# Patient Record
Sex: MALE | Race: BLACK OR AFRICAN AMERICAN | Employment: UNEMPLOYED | URBAN - METROPOLITAN AREA
[De-identification: names, ages, dates, MRNs, and addresses within clinical notes are randomized per-mention and may not be internally consistent; named-entity substitution may affect disease eponyms.]

---

## 2017-02-17 ENCOUNTER — OFFICE VISIT (OUTPATIENT)
Dept: FAMILY MEDICINE CLINIC | Age: 52
End: 2017-02-17

## 2017-02-17 VITALS
OXYGEN SATURATION: 97 % | SYSTOLIC BLOOD PRESSURE: 150 MMHG | HEART RATE: 70 BPM | DIASTOLIC BLOOD PRESSURE: 102 MMHG | WEIGHT: 223.5 LBS | RESPIRATION RATE: 18 BRPM | BODY MASS INDEX: 33.1 KG/M2 | TEMPERATURE: 96.9 F | HEIGHT: 69 IN

## 2017-02-17 DIAGNOSIS — M54.42 CHRONIC BILATERAL LOW BACK PAIN WITH BILATERAL SCIATICA: ICD-10-CM

## 2017-02-17 DIAGNOSIS — G89.4 CHRONIC PAIN SYNDROME: Primary | ICD-10-CM

## 2017-02-17 DIAGNOSIS — M54.2 POSTERIOR NECK PAIN: ICD-10-CM

## 2017-02-17 DIAGNOSIS — I10 HTN (HYPERTENSION), BENIGN: ICD-10-CM

## 2017-02-17 DIAGNOSIS — G89.29 CHRONIC BILATERAL LOW BACK PAIN WITH BILATERAL SCIATICA: ICD-10-CM

## 2017-02-17 DIAGNOSIS — M54.41 CHRONIC BILATERAL LOW BACK PAIN WITH BILATERAL SCIATICA: ICD-10-CM

## 2017-02-17 RX ORDER — OXYCODONE AND ACETAMINOPHEN 10; 325 MG/1; MG/1
.5-1 TABLET ORAL
Qty: 30 TAB | Refills: 0 | Status: SHIPPED | OUTPATIENT
Start: 2017-02-17 | End: 2018-11-12 | Stop reason: ALTCHOICE

## 2017-02-17 RX ORDER — ATENOLOL 25 MG/1
TABLET ORAL
Qty: 30 TAB | Refills: 3 | Status: SHIPPED | OUTPATIENT
Start: 2017-02-17 | End: 2018-03-04 | Stop reason: SDUPTHER
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
## 2017-02-17 NOTE — PATIENT INSTRUCTIONS
TODAY, please go to:   [facility] a urine sample    Please schedule the following appointments at [address] [place] [address]:  · Lab visit, fasting at least one week before you return   · Blood pressure follow up with Dr. Juliann Hidalgo in 3-4 weeks  _____________________     LQBSG'C Plan:  · Start atenolol again, continue other blood pressure medications  _____________________     Review your health maintenance below. Make plans to return and address anything that is due or will be due soon. There are no preventive care reminders to display for this patient.

## 2017-02-17 NOTE — MR AVS SNAPSHOT
Visit Information Date & Time Provider Department Dept. Phone Encounter #  
 2/17/2017  1:20 PM Roz Dominique. Perry Howard MD [place] [id number] [phone] Upcoming Health Maintenance Date Due DTaP/Tdap/Td series (2 - Td) 2/27/2018 Allergies as of 2/17/2017  Review Complete On: 2/17/2017 By: Roz Dominique. Perry Howard MD  
  
 Severity Noted Reaction Type Reactions Amoxicillin  01/12/2010    Rash Augmentin [Amoxicillin-pot Clavulanate]  01/12/2010    Rash Coconut  03/14/2011    Rash Pineapple  03/14/2011    Swelling Current Immunizations  Reviewed on 11/30/2016 Name Date Influenza Vaccine (Quad) PF 11/30/2016 Influenza Vaccine Split 10/18/2010 TDAP Vaccine 2/27/2008 Not reviewed this visit You Were Diagnosed With   
  
 Codes Comments Chronic pain syndrome    -  Primary ICD-10-CM: G89.4 ICD-9-CM: 338.4 Posterior neck pain     ICD-10-CM: M54.2 ICD-9-CM: 723.1 Chronic bilateral low back pain with bilateral sciatica     ICD-10-CM: M54.42, M54.41, G89.29 ICD-9-CM: 724.2, 724.3, 338.29 HTN (hypertension), benign     ICD-10-CM: I10 
ICD-9-CM: 401.1 Vitals BP Pulse Temp Resp Height(growth percentile) Weight(growth percentile) (!) 150/102 (BP 1 Location: Left arm, BP Patient Position: Sitting) 70 96.9 °F (36.1 °C) (Oral) 18 5' 9\" (1.753 m) 223 lb 8 oz (101.4 kg) SpO2 BMI Smoking Status 97% 33.01 kg/m2 Former Smoker Vitals History BMI and BSA Data Body Mass Index Body Surface Area 33.01 kg/m 2 2.22 m 2 Preferred Pharmacy Pharmacy Name Phone [facility] [address] [place] [address] [phone] Your Updated Medication List  
  
   
This list is accurate as of: 2/17/17  2:34 PM.  Always use your most recent med list. amLODIPine 10 mg tablet Commonly known as:  Tad Yoan TAKE ONE TABLET BY MOUTH ONCE DAILY  
  
 atenolol 25 mg tablet Commonly known as:  TENORMIN  
TAKE 1 TABLET BY MOUTH ONCE A DAY  
  
 chlorthalidone 25 mg tablet Commonly known as:  HYGROTEN  
TAKE ONE TABLET BY MOUTH ONCE DAILY FOR BLOOD PRESSURE Eplerenone 50 mg Tab TAKE ONE TABLET BY MOUTH ONCE DAILY FOR BLOOD PRESSURE  
  
 oxyCODONE-acetaminophen  mg per tablet Commonly known as:  PERCOCET 10 Take 0.5-1 Tabs by mouth daily as needed for Pain.  
  
 pravastatin 80 mg tablet Commonly known as:  PRAVACHOL  
TAKE ONE TABLET BY MOUTH ONCE DAILY Prescriptions Printed Refills  
 oxyCODONE-acetaminophen (PERCOCET 10)  mg per tablet 0 Sig: Take 0.5-1 Tabs by mouth daily as needed for Pain. Class: Print Route: Oral  
  
Prescriptions Sent to Pharmacy Refills  
 atenolol (TENORMIN) 25 mg tablet 3 Sig: TAKE 1 TABLET BY MOUTH ONCE A DAY Class: Normal  
 Pharmacy: [address] #: [phone] We Performed the Following [address] [place] [address] Street MONITORING [[id number] Custom] Patient Instructions TODAY, please go to: CHECK OUT Leave a urine sample Please schedule the following appointments at [facility] OUT: 
· Lab visit, fasting at least one week before you return · Blood pressure follow up with Dr. Delon Wright in 3-4 weeks 
_____________________ Today's Plan: 
· Start atenolol again, continue other blood pressure medications 
_____________________ Review your health maintenance below. Make plans to return and address anything that is due or will be due soon. There are no preventive care reminders to display for this patient. Introducing [facility]! Janell Styles introduces [employer] patient portal. Now you can access parts of your medical record, email your doctor's office, and request medication refills online. 1. In your internet browser, go to [email or web address]. [employer] 2. Click on the First Time User? Click Here link in the Sign In box. You will see the New Member Sign Up page. 3. Enter your [employer] Access Code exactly as it appears below. You will not need to use this code after youve completed the sign-up process. If you do not sign up before the expiration date, you must request a new code. · [employer] Access Code: [email or web address] Expires: 2/28/2017 11:50 AM 
 
4. Enter the last four digits of your Social Security Number (xxxx) and Date of Birth (mm/dd/yyyy) as indicated and click Submit. You will be taken to the next sign-up page. 5. Create a [employer] ID. This will be your [employer] login ID and cannot be changed, so think of one that is secure and easy to remember. 6. Create a [employer] password. You can change your password at any time. 7. Enter your Password Reset Question and Answer. This can be used at a later time if you forget your password. 8. Enter your e-mail address. You will receive e-mail notification when new information is available in [address] 9. Click Sign Up. You can now view and download portions of your medical record. 10. Click the Download Summary menu link to download a portable copy of your medical information. If you have questions, please visit the Frequently Asked Questions section of the [employer] website. Remember, [employer] is NOT to be used for urgent needs. For medical emergencies, dial 911. Now available from your iPhone and Android! Please provide this summary of care documentation to your next provider. Your primary care clinician is listed as Kaylie Lord. Isaac Delgado. If you have any questions after today's visit, please call [phone].

## 2017-02-17 NOTE — PROGRESS NOTES
Chief Complaint   Patient presents with   [facility] Establish Care     1. Have you been to the ER, urgent care clinic since your last visit? Hospitalized since your last visit? No    2. Have you seen or consulted any other health care providers outside of the [address] [place] [address] since your last visit? Include any pap smears or colon screening. No    The patient was counseled on the dangers of tobacco use, and was Patient states he doesn't smoke. Quit 06/01/1991 . There are no preventive care reminders to display for this patient. In the event something were to happen to you and you were unable to speak on your behalf, do you have an Advance Directive/ Living Will in place stating your wishes? No  If yes, do we have a copy on file: No    If no, would you like information:   Patient offered and declined, stated that he got a packet at last office visit.

## 2017-02-21 DIAGNOSIS — I10 HTN (HYPERTENSION), BENIGN: ICD-10-CM

## 2017-02-21 NOTE — TELEPHONE ENCOUNTER
He was just in the office to meet Dr Ellen Ritter. He is due back in for follow up in 1 month-this needs to be scheduled-the follow up in the computer for the end of April has been cancelled and needs to be changed to sooner appt.

## 2017-02-22 RX ORDER — AMLODIPINE BESYLATE 10 MG/1
TABLET ORAL
Qty: 30 TAB | Refills: 11 | Status: SHIPPED | OUTPATIENT
Start: 2017-02-22 | End: 2018-03-04 | Stop reason: SDUPTHER

## 2017-02-22 RX ORDER — CHLORTHALIDONE 25 MG/1
TABLET ORAL
Qty: 30 TAB | Refills: 11 | Status: SHIPPED | OUTPATIENT
Start: 2017-02-22 | End: 2018-03-04 | Stop reason: SDUPTHER

## 2017-03-20 ENCOUNTER — LAB ONLY (OUTPATIENT)
Dept: FAMILY MEDICINE CLINIC | Age: 52
End: 2017-03-20

## 2017-03-20 DIAGNOSIS — Z00.00 HEALTHCARE MAINTENANCE: Primary | ICD-10-CM

## 2017-03-20 DIAGNOSIS — E55.9 VITAMIN D DEFICIENCY: ICD-10-CM

## 2017-03-20 DIAGNOSIS — I10 HTN (HYPERTENSION), BENIGN: ICD-10-CM

## 2017-03-20 DIAGNOSIS — E78.5 DYSLIPIDEMIA: ICD-10-CM

## 2017-08-21 DIAGNOSIS — E78.5 DYSLIPIDEMIA: ICD-10-CM

## 2017-08-21 DIAGNOSIS — I10 HTN (HYPERTENSION), BENIGN: ICD-10-CM

## 2017-08-21 RX ORDER — PRAVASTATIN SODIUM 80 MG/1
TABLET ORAL
Qty: 30 TAB | Refills: 11 | Status: SHIPPED | OUTPATIENT
Start: 2017-08-21 | End: 2018-11-12 | Stop reason: SDUPTHER

## 2017-08-21 RX ORDER — EPLERENONE 50 MG/1
TABLET, FILM COATED ORAL
Qty: 30 TAB | Refills: 11 | Status: SHIPPED | OUTPATIENT
Start: 2017-08-21 | End: 2018-12-31 | Stop reason: SDUPTHER

## 2018-03-04 DIAGNOSIS — I10 HTN (HYPERTENSION), BENIGN: ICD-10-CM

## 2018-03-06 NOTE — TELEPHONE ENCOUNTER
PCP: Prakash Tadeo. René Muñoz MD    Last appt: 2/17/2017  No future appointments.     Requested Prescriptions     Pending Prescriptions Disp Refills    amLODIPine (NORVASC) 10 mg tablet [Pharmacy Med Name: AMLODIPINE 10MG TAB] 30 Tab 11     Sig: TAKE ONE TABLET BY MOUTH ONCE DAILY    atenolol (TENORMIN) 25 mg tablet [Pharmacy Med Name: ATENOLOL 25MG       TAB] 30 Tab 3     Sig: TAKE ONE TABLET BY MOUTH ONCE DAILY    chlorthalidone (HYGROTEN) 25 mg tablet [Pharmacy Med Name: CHLORTHALIDONE 25MG    TAB] 30 Tab 11     Sig: TAKE ONE TABLET BY MOUTH ONCE DAILY FOR BLOOD PRESSURE     Lab Results   Component Value Date/Time    Sodium 139 06/11/2015 09:16 AM    Potassium 3.6 06/11/2015 09:16 AM    Chloride 96 (L) 06/11/2015 09:16 AM    CO2 25 06/11/2015 09:16 AM    Anion gap 7 05/31/2012 11:02 AM    Glucose 99 06/11/2015 09:16 AM    BUN 15 06/11/2015 09:16 AM    Creatinine 1.26 06/11/2015 09:16 AM    BUN/Creatinine ratio 12 06/11/2015 09:16 AM    GFR est AA 78 06/11/2015 09:16 AM    GFR est non-AA 67 06/11/2015 09:16 AM    Calcium 9.4 06/11/2015 09:16 AM     Lab Results   Component Value Date/Time    Hemoglobin A1c 5.4 09/19/2011 12:03 PM     Lab Results   Component Value Date/Time    Cholesterol, total 180 06/11/2015 09:16 AM    HDL Cholesterol 42 06/11/2015 09:16 AM    LDL, calculated 116 (H) 06/11/2015 09:16 AM    VLDL, calculated 22 06/11/2015 09:16 AM    Triglyceride 109 06/11/2015 09:16 AM    CHOL/HDL Ratio 4.3 08/09/2010 10:55 AM     No results found for: TSH, TSH2, TSH3, TSHP, TSHEXT

## 2018-03-07 RX ORDER — CHLORTHALIDONE 25 MG/1
TABLET ORAL
Qty: 30 TAB | Refills: 0 | Status: SHIPPED | OUTPATIENT
Start: 2018-03-07 | End: 2018-11-12 | Stop reason: SDUPTHER

## 2018-03-07 RX ORDER — AMLODIPINE BESYLATE 10 MG/1
TABLET ORAL
Qty: 30 TAB | Refills: 0 | Status: SHIPPED | OUTPATIENT
Start: 2018-03-07 | End: 2018-11-12 | Stop reason: SDUPTHER

## 2018-03-07 RX ORDER — ATENOLOL 25 MG/1
TABLET ORAL
Qty: 30 TAB | Refills: 0 | Status: SHIPPED | OUTPATIENT
Start: 2018-03-07 | End: 2018-11-12 | Stop reason: SDUPTHER

## 2018-03-07 NOTE — TELEPHONE ENCOUNTER
Please call patient. Need office visit and also needs lab visit for fasting labs, the week before. No future appointments.

## 2018-11-12 ENCOUNTER — OFFICE VISIT (OUTPATIENT)
Dept: FAMILY MEDICINE CLINIC | Age: 53
End: 2018-11-12

## 2018-11-12 VITALS
RESPIRATION RATE: 16 BRPM | WEIGHT: 230.8 LBS | SYSTOLIC BLOOD PRESSURE: 170 MMHG | HEART RATE: 86 BPM | TEMPERATURE: 97.5 F | OXYGEN SATURATION: 96 % | DIASTOLIC BLOOD PRESSURE: 130 MMHG | HEIGHT: 69 IN | BODY MASS INDEX: 34.18 KG/M2

## 2018-11-12 DIAGNOSIS — Z23 ENCOUNTER FOR IMMUNIZATION: Primary | ICD-10-CM

## 2018-11-12 DIAGNOSIS — E78.5 DYSLIPIDEMIA: ICD-10-CM

## 2018-11-12 DIAGNOSIS — I10 HTN (HYPERTENSION), BENIGN: ICD-10-CM

## 2018-11-12 RX ORDER — EPLERENONE 50 MG/1
TABLET, FILM COATED ORAL
Qty: 30 TAB | Refills: 11 | Status: CANCELLED | OUTPATIENT
Start: 2018-11-12

## 2018-11-12 RX ORDER — CHLORTHALIDONE 25 MG/1
TABLET ORAL
Qty: 30 TAB | Refills: 0 | Status: SHIPPED | OUTPATIENT
Start: 2018-11-12 | End: 2018-12-31 | Stop reason: SDUPTHER

## 2018-11-12 RX ORDER — ATENOLOL 25 MG/1
TABLET ORAL
Qty: 30 TAB | Refills: 0 | Status: SHIPPED | OUTPATIENT
Start: 2018-11-12 | End: 2018-12-31 | Stop reason: SDUPTHER

## 2018-11-12 RX ORDER — AMLODIPINE BESYLATE 10 MG/1
TABLET ORAL
Qty: 30 TAB | Refills: 0 | Status: SHIPPED | OUTPATIENT
Start: 2018-11-12 | End: 2018-12-31 | Stop reason: SDUPTHER

## 2018-11-12 RX ORDER — PRAVASTATIN SODIUM 80 MG/1
TABLET ORAL
Qty: 30 TAB | Refills: 0 | Status: SHIPPED | OUTPATIENT
Start: 2018-11-12 | End: 2018-12-31 | Stop reason: SDUPTHER

## 2018-11-12 NOTE — PROGRESS NOTES
Nick [facility] Sr. Identified pt with two pt identifiers(name and ). Chief Complaint Patient presents with  New Patient [facility] Establish Care  Medication Refill 1. Have you been to the ER, urgent care clinic since your last visit? Hospitalized since your last visit? No 
 
2. Have you seen or consulted any other health care providers outside of the [facility] since your last visit? Include any pap smears or colon screening. No 
 
In the event something were to happen to you and you were unable to speak on your behalf, do you have an Advance Directive/ Living Will in place stating your wishes? YES If yes, do we have a copy on file YES If no, would you like information:     
 
 
 
Would you like to sign up for [employer] today, if you have not already done so? Patient declined If not, would you like information on MyChart, and how to sign up at a later time? No 
 
 
Medication reconciliation up to date and corrected with patient at this time. Today's provider has been notified of reason for visit, vitals and flowsheets obtained on patients. Reviewed record in preparation for visit, huddled with provider and have obtained necessary documentation. Health Maintenance Due Topic  Shingrix Vaccine Age 50> (1 of 2)  FOBT Q 1 YEAR AGE 50-75  DTaP/Tdap/Td series (2 - Td)  Influenza Age 5 to Adult Wt Readings from Last 3 Encounters:  
18 230 lb 12.8 oz (104.7 kg) 17 223 lb 8 oz (101.4 kg) 16 211 lb (95.7 kg) Temp Readings from Last 3 Encounters:  
17 96.9 °F (36.1 °C) (Oral)  
16 97 °F (36.1 °C)  
16 96.6 °F (35.9 °C) (Oral) BP Readings from Last 3 Encounters:  
17 (!) 150/102  
16 (!) 143/91  
16 134/86 Pulse Readings from Last 3 Encounters:  
17 70  
16 66  
16 67 Vitals:  
 18 1447 18 1454 BP: (!) 188/141 (!) 170/130 Pulse: 86 Resp: 16   
 Temp: 97.5 °F (36.4 °C) TempSrc: Oral   
SpO2: 96% Weight: 230 lb 12.8 oz (104.7 kg) Height: 5' 8.9\" (1.75 m) PainSc:   6 PainLoc: Generalized Learning Assessment: 
:  
 
Learning Assessment 2/3/2014 PRIMARY LEARNER Patient HIGHEST LEVEL OF EDUCATION - PRIMARY LEARNER  DID NOT GRADUATE HIGH SCHOOL  
BARRIERS PRIMARY LEARNER NONE  
CO-LEARNER CAREGIVER No  
PRIMARY LANGUAGE ENGLISH  
LEARNER PREFERENCE PRIMARY VIDEOS  
ANSWERED BY patient RELATIONSHIP SELF Depression Screening: 
:  
 
PHQ over the last two weeks 11/12/2018 Little interest or pleasure in doing things Several days Feeling down, depressed, irritable, or hopeless Several days Total Score PHQ 2 2 Fall Risk Assessment: 
:  
 
Fall Risk Assessment, last 12 mths 11/12/2018 Able to walk? Yes Fall in past 12 months? No  
 
 
Abuse Screening: 
:  
 
Abuse Screening Questionnaire 11/12/2018 2/17/2017 Do you ever feel afraid of your partner? Cassia Mode Are you in a relationship with someone who physically or mentally threatens you? Cassia Mode Is it safe for you to go home? Y Y  
 
 
ADL Screening: 
:  
 

## 2018-11-12 NOTE — PROGRESS NOTES
Immunization/s administered 11/12/2018 by Sarah Pope with patient's consent. Patient tolerated procedure well. No reactions noted.

## 2018-11-12 NOTE — PROGRESS NOTES
Let BP meds run out. States \"blew it off\" as not good experience with Dr. Carla Duff last [address]. Knows untreated BP can cause strokes and heart attacks. Lots of stress past few years. States does need to see pain specialist.  States tramadol puts him to sleep. States has neurostim in back from [facility]. Ref from ortho to surgeon. Out of town a lot. Wants to get back into . Visit Vitals BP (!) 170/130 (BP 1 Location: Left arm, BP Patient Position: Sitting) Comment: taking manually Pulse 86 Temp 97.5 °F (36.4 °C) (Oral) Resp 16 Ht 5' 8.9\" (1.75 m) Wt 230 lb 12.8 oz (104.7 kg) SpO2 96% BMI 34.18 kg/m² Patient alert and cooperative. Reviewed above. Assessment: 1. HTN, markedly uncontrolled off his four medicines. Plan: 
1. Will get him back on three of the four. 2. Return in a month. 3. Refill of cholesterol medicine. 4. Will get labs once we are back on stable BP regimen. 5. Offered referral to pain specialist for chronic narcotic therapy. Patient declined at this time. 6. Follow otherwise here prn.

## 2018-12-31 DIAGNOSIS — E78.5 DYSLIPIDEMIA: ICD-10-CM

## 2018-12-31 DIAGNOSIS — I10 HTN (HYPERTENSION), BENIGN: ICD-10-CM

## 2018-12-31 RX ORDER — PRAVASTATIN SODIUM 80 MG/1
TABLET ORAL
Qty: 30 TAB | Refills: 0 | Status: SHIPPED | OUTPATIENT
Start: 2018-12-31 | End: 2019-03-07 | Stop reason: SDUPTHER

## 2018-12-31 RX ORDER — CHLORTHALIDONE 25 MG/1
TABLET ORAL
Qty: 30 TAB | Refills: 0 | Status: SHIPPED | OUTPATIENT
Start: 2018-12-31 | End: 2019-03-07 | Stop reason: SDUPTHER

## 2018-12-31 RX ORDER — AMLODIPINE BESYLATE 10 MG/1
TABLET ORAL
Qty: 30 TAB | Refills: 0 | Status: SHIPPED | OUTPATIENT
Start: 2018-12-31 | End: 2019-03-07 | Stop reason: SDUPTHER

## 2018-12-31 RX ORDER — ATENOLOL 25 MG/1
TABLET ORAL
Qty: 30 TAB | Refills: 0 | Status: SHIPPED | OUTPATIENT
Start: 2018-12-31 | End: 2019-03-07 | Stop reason: SDUPTHER

## 2018-12-31 RX ORDER — EPLERENONE 50 MG/1
TABLET, FILM COATED ORAL
Qty: 30 TAB | Refills: 0 | Status: SHIPPED | OUTPATIENT
Start: 2018-12-31 | End: 2019-03-07 | Stop reason: SDUPTHER

## 2018-12-31 NOTE — TELEPHONE ENCOUNTER
PCP: Sumit Guajardo MD    Last appt: 12/10/2018  No future appointments.     Requested Prescriptions     Pending Prescriptions Disp Refills    pravastatin (PRAVACHOL) 80 mg tablet 30 Tab 0     Sig: TAKE ONE TABLET BY MOUTH ONCE DAILY    eplerenone (INSPRA) 50 mg tab tablet 30 Tab 11     Sig: TAKE ONE TABLET BY MOUTH ONCE DAILY FOR BLOOD PRESSURE    chlorthalidone (HYGROTEN) 25 mg tablet 30 Tab 0     Sig: TAKE ONE TABLET BY MOUTH ONCE DAILY FOR BLOOD PRESSURE    atenolol (TENORMIN) 25 mg tablet 30 Tab 0     Sig: TAKE ONE TABLET BY MOUTH ONCE DAILY    amLODIPine (NORVASC) 10 mg tablet 30 Tab 0     Sig: TAKE ONE TABLET BY MOUTH ONCE DAILY       Prior labs and Blood pressures:  BP Readings from Last 3 Encounters:   11/12/18 (!) 170/130   02/17/17 (!) 150/102   11/30/16 (!) 143/91     Lab Results   Component Value Date/Time    Sodium 139 06/11/2015 09:16 AM    Potassium 3.6 06/11/2015 09:16 AM    Chloride 96 (L) 06/11/2015 09:16 AM    CO2 25 06/11/2015 09:16 AM    Anion gap 7 05/31/2012 11:02 AM    Glucose 99 06/11/2015 09:16 AM    BUN 15 06/11/2015 09:16 AM    Creatinine 1.26 06/11/2015 09:16 AM    BUN/Creatinine ratio 12 06/11/2015 09:16 AM    GFR est AA 78 06/11/2015 09:16 AM    GFR est non-AA 67 06/11/2015 09:16 AM    Calcium 9.4 06/11/2015 09:16 AM     Lab Results   Component Value Date/Time    Hemoglobin A1c 5.4 09/19/2011 12:03 PM     Lab Results   Component Value Date/Time    Cholesterol, total 180 06/11/2015 09:16 AM    HDL Cholesterol 42 06/11/2015 09:16 AM    LDL, calculated 116 (H) 06/11/2015 09:16 AM    VLDL, calculated 22 06/11/2015 09:16 AM    Triglyceride 109 06/11/2015 09:16 AM    CHOL/HDL Ratio 4.3 08/09/2010 10:55 AM     Lab Results   Component Value Date/Time    VITAMIN D, 25-HYDROXY 20.3 (L) 06/11/2015 09:16 AM       No results found for: TSH, TSH2, TSH3, TSHP, TSHEXT

## 2018-12-31 NOTE — TELEPHONE ENCOUNTER
Requested Prescriptions     Pending Prescriptions Disp Refills    pravastatin (PRAVACHOL) 80 mg tablet 30 Tab 0     Sig: TAKE ONE TABLET BY MOUTH ONCE DAILY    eplerenone (INSPRA) 50 mg tab tablet 30 Tab 11     Sig: TAKE ONE TABLET BY MOUTH ONCE DAILY FOR BLOOD PRESSURE    chlorthalidone (HYGROTEN) 25 mg tablet 30 Tab 0     Sig: TAKE ONE TABLET BY MOUTH ONCE DAILY FOR BLOOD PRESSURE    atenolol (TENORMIN) 25 mg tablet 30 Tab 0     Sig: TAKE ONE TABLET BY MOUTH ONCE DAILY    amLODIPine (NORVASC) 10 mg tablet 30 Tab 0     Sig: TAKE ONE TABLET BY MOUTH ONCE DAILY

## 2019-03-07 ENCOUNTER — OFFICE VISIT (OUTPATIENT)
Dept: FAMILY MEDICINE CLINIC | Age: 54
End: 2019-03-07

## 2019-03-07 VITALS
SYSTOLIC BLOOD PRESSURE: 142 MMHG | HEIGHT: 69 IN | RESPIRATION RATE: 20 BRPM | TEMPERATURE: 97.3 F | WEIGHT: 228.9 LBS | HEART RATE: 60 BPM | BODY MASS INDEX: 33.9 KG/M2 | DIASTOLIC BLOOD PRESSURE: 100 MMHG | OXYGEN SATURATION: 99 %

## 2019-03-07 DIAGNOSIS — I10 HTN (HYPERTENSION), BENIGN: Primary | ICD-10-CM

## 2019-03-07 DIAGNOSIS — E78.5 DYSLIPIDEMIA: ICD-10-CM

## 2019-03-07 DIAGNOSIS — E55.9 VITAMIN D DEFICIENCY: ICD-10-CM

## 2019-03-07 DIAGNOSIS — Z12.5 PROSTATE CANCER SCREENING: ICD-10-CM

## 2019-03-07 RX ORDER — ATENOLOL 25 MG/1
TABLET ORAL
Qty: 30 TAB | Refills: 0 | Status: SHIPPED | OUTPATIENT
Start: 2019-03-07 | End: 2019-05-14 | Stop reason: SDUPTHER

## 2019-03-07 RX ORDER — PRAVASTATIN SODIUM 80 MG/1
TABLET ORAL
Qty: 30 TAB | Refills: 0 | Status: SHIPPED | OUTPATIENT
Start: 2019-03-07 | End: 2019-05-14 | Stop reason: SDUPTHER

## 2019-03-07 RX ORDER — AMLODIPINE BESYLATE 10 MG/1
TABLET ORAL
Qty: 30 TAB | Refills: 0 | Status: SHIPPED | OUTPATIENT
Start: 2019-03-07 | End: 2019-05-14 | Stop reason: SDUPTHER

## 2019-03-07 RX ORDER — EPLERENONE 50 MG/1
TABLET, FILM COATED ORAL
Qty: 30 TAB | Refills: 0 | Status: SHIPPED | OUTPATIENT
Start: 2019-03-07 | End: 2019-05-14 | Stop reason: SDUPTHER

## 2019-03-07 RX ORDER — CHLORTHALIDONE 25 MG/1
TABLET ORAL
Qty: 30 TAB | Refills: 0 | Status: SHIPPED | OUTPATIENT
Start: 2019-03-07 | End: 2019-05-14 | Stop reason: SDUPTHER

## 2019-03-07 NOTE — PROGRESS NOTES
Stephanie Craft Sr. Identified pt with two pt identifiers(name and ). Chief Complaint   Patient presents with    Letter for School/Work     needs a return to work letter from Dr. Pippa Delaney       1. Have you been to the ER, urgent care clinic since your last visit? Hospitalized since your last visit? No    2. Have you seen or consulted any other health care providers outside of the [address] [place] [address] since your last visit? Include any pap smears or colon screening. No      Would you like to sign up for MyChart today, if you have not already done so? No  If not, would you like information on MyChart, and how to sign up at a later time? No      Medication reconciliation up to date and corrected with patient at this time. Today's provider has been notified of reason for visit, vitals and flowsheets obtained on patients. Reviewed record in preparation for visit, huddled with provider and have obtained necessary documentation.       Health Maintenance Due   Topic    Shingrix Vaccine Age 50> (1 of 2)    FOBT Q 1 YEAR AGE 50-75     DTaP/Tdap/Td series (2 - Td)       Wt Readings from Last 3 Encounters:   19 228 lb 14.4 oz (103.8 kg)   18 230 lb 12.8 oz (104.7 kg)   17 223 lb 8 oz (101.4 kg)     Temp Readings from Last 3 Encounters:   19 97.3 °F (36.3 °C) (Oral)   18 97.5 °F (36.4 °C) (Oral)   17 96.9 °F (36.1 °C) (Oral)     BP Readings from Last 3 Encounters:   19 (!) 142/100   18 (!) 170/130   17 (!) 150/102     Pulse Readings from Last 3 Encounters:   19 60   18 86   17 70     Vitals:    19 1344 19 1349   BP: (!) 161/114 (!) 142/100   Pulse: 60    Resp: 20    Temp: 97.3 °F (36.3 °C)    TempSrc: Oral    SpO2: 99%    Weight: 228 lb 14.4 oz (103.8 kg)    Height: 5' 9\" (1.753 m)    PainSc:   4    PainLoc: Back          Learning Assessment:  :     Learning Assessment 2/3/2014   PRIMARY LEARNER Patient   HIGHEST LEVEL OF EDUCATION - PRIMARY LEARNER  DID NOT GRADUATE HIGH SCHOOL   BARRIERS PRIMARY LEARNER NONE   CO-LEARNER CAREGIVER No   PRIMARY LANGUAGE ENGLISH   LEARNER PREFERENCE PRIMARY VIDEOS   ANSWERED BY patient   RELATIONSHIP SELF       Depression Screening:  :     3 most recent PHQ Screens 3/7/2019   Little interest or pleasure in doing things Not at all   Feeling down, depressed, irritable, or hopeless Not at all   Total Score PHQ 2 0       Fall Risk Assessment:  :     Fall Risk Assessment, last 12 mths 3/7/2019   Able to walk? Yes   Fall in past 12 months? No       Abuse Screening:  :     Abuse Screening Questionnaire 3/7/2019 11/12/2018 2/17/2017   Do you ever feel afraid of your partner? N N N   Are you in a relationship with someone who physically or mentally threatens you? N N N   Is it safe for you to go home?  Y Y Y       ADL Screening:  :     ADL Assessment 3/7/2019   Feeding yourself No Help Needed   Getting from bed to chair No Help Needed   Getting dressed No Help Needed   Bathing or showering No Help Needed   Walk across the room (includes cane/walker) No Help Needed   Using the telphone No Help Needed   Taking your medications No Help Needed   Preparing meals No Help Needed   Managing money (expenses/bills) No Help Needed   Moderately strenuous housework (laundry) No Help Needed   Shopping for personal items (toiletries/medicines) No Help Needed   Shopping for groceries No Help Needed   Driving No Help Needed   Climbing a flight of stairs No Help Needed   Getting to places beyond walking distances No Help Needed

## 2019-03-07 NOTE — ACP (ADVANCE CARE PLANNING)
In the event something were to happen to you and you were unable to speak on your behalf, do you have an Advance Directive/ Living Will in place stating your wishes?  YES    If yes, do we have a copy on file YES    Patient states that no changes have been made to ACP on file

## 2019-03-07 NOTE — PROGRESS NOTES
Not taking med daily. On the road a lot. Discussed use of pill box. Patient denies chest pain, dyspnea, unexpected weight change, unexpected pain, mood or memory changes. HTN-worse. Noncompliant with meds. Hyperlipids-worse. Noncompliant with meds. Visit Vitals  BP (!) 142/100 (BP 1 Location: Left arm, BP Patient Position: Sitting) Comment: taken manually with large cuff   Pulse 60   Temp 97.3 °F (36.3 °C) (Oral)   Resp 20   Ht 5' 9\" (1.753 m)   Wt 228 lb 14.4 oz (103.8 kg)   SpO2 99%   BMI 33.80 kg/m²       Patient alert and cooperative. Reviewed above. Assessment:  1. HTN, uncontrolled as is not taking medicine on a regular basis. 2. Previous hyperlipids, on high dose Pravastatin, again not taking med daily. Plan:  1. Check labs. 2. Refilled all meds for a month. 3. Return prior to running out for BP check. 4. Completed work form as best as I could. 5. Follow otherwise here prn.

## 2019-03-08 LAB
25(OH)D3+25(OH)D2 SERPL-MCNC: 9.7 NG/ML (ref 30–100)
ALBUMIN SERPL-MCNC: 4.4 G/DL (ref 3.5–5.5)
ALBUMIN/GLOB SERPL: 1.5 {RATIO} (ref 1.2–2.2)
ALP SERPL-CCNC: 80 IU/L (ref 39–117)
ALT SERPL-CCNC: 27 IU/L (ref 0–44)
AST SERPL-CCNC: 24 IU/L (ref 0–40)
BASOPHILS # BLD AUTO: 0 X10E3/UL (ref 0–0.2)
BASOPHILS NFR BLD AUTO: 0 %
BILIRUB SERPL-MCNC: <0.2 MG/DL (ref 0–1.2)
BUN SERPL-MCNC: 8 MG/DL (ref 6–24)
BUN/CREAT SERPL: 8 (ref 9–20)
CALCIUM SERPL-MCNC: 9.9 MG/DL (ref 8.7–10.2)
CHLORIDE SERPL-SCNC: 102 MMOL/L (ref 96–106)
CHOLEST SERPL-MCNC: 226 MG/DL (ref 100–199)
CO2 SERPL-SCNC: 30 MMOL/L (ref 20–29)
CREAT SERPL-MCNC: 1.04 MG/DL (ref 0.76–1.27)
EOSINOPHIL # BLD AUTO: 0.2 X10E3/UL (ref 0–0.4)
EOSINOPHIL NFR BLD AUTO: 3 %
ERYTHROCYTE [DISTWIDTH] IN BLOOD BY AUTOMATED COUNT: 15.3 % (ref 12.3–15.4)
GLOBULIN SER CALC-MCNC: 2.9 G/DL (ref 1.5–4.5)
GLUCOSE SERPL-MCNC: 90 MG/DL (ref 65–99)
HCT VFR BLD AUTO: 39.2 % (ref 37.5–51)
HDLC SERPL-MCNC: 40 MG/DL
HGB BLD-MCNC: 13.3 G/DL (ref 13–17.7)
IMM GRANULOCYTES # BLD AUTO: 0 X10E3/UL (ref 0–0.1)
IMM GRANULOCYTES NFR BLD AUTO: 0 %
INTERPRETATION, 910389: NORMAL
LDLC SERPL CALC-MCNC: 137 MG/DL (ref 0–99)
LYMPHOCYTES # BLD AUTO: 2 X10E3/UL (ref 0.7–3.1)
LYMPHOCYTES NFR BLD AUTO: 27 %
MCH RBC QN AUTO: 27.3 PG (ref 26.6–33)
MCHC RBC AUTO-ENTMCNC: 33.9 G/DL (ref 31.5–35.7)
MCV RBC AUTO: 81 FL (ref 79–97)
MONOCYTES # BLD AUTO: 0.5 X10E3/UL (ref 0.1–0.9)
MONOCYTES NFR BLD AUTO: 7 %
NEUTROPHILS # BLD AUTO: 4.7 X10E3/UL (ref 1.4–7)
NEUTROPHILS NFR BLD AUTO: 63 %
PLATELET # BLD AUTO: 285 X10E3/UL (ref 150–379)
POTASSIUM SERPL-SCNC: 3.8 MMOL/L (ref 3.5–5.2)
PROT SERPL-MCNC: 7.3 G/DL (ref 6–8.5)
PSA SERPL-MCNC: 1.2 NG/ML (ref 0–4)
RBC # BLD AUTO: 4.87 X10E6/UL (ref 4.14–5.8)
SODIUM SERPL-SCNC: 146 MMOL/L (ref 134–144)
TRIGL SERPL-MCNC: 244 MG/DL (ref 0–149)
TSH SERPL DL<=0.005 MIU/L-ACNC: 0.89 UIU/ML (ref 0.45–4.5)
VLDLC SERPL CALC-MCNC: 49 MG/DL (ref 5–40)
WBC # BLD AUTO: 7.5 X10E3/UL (ref 3.4–10.8)

## 2019-03-12 NOTE — PROGRESS NOTES
Very Low Vit. D.  Begin ergocalciferol 50 K units twice weekly and recheck 3 mos. Lipids up a little. Rec get heart scan to assess CAD risk.   Rest O.K.

## 2019-03-18 DIAGNOSIS — E55.9 VITAMIN D DEFICIENCY: Primary | ICD-10-CM

## 2019-03-18 RX ORDER — ERGOCALCIFEROL 1.25 MG/1
50000 CAPSULE ORAL 2 TIMES WEEKLY
Qty: 24 CAP | Refills: 0 | Status: SHIPPED | OUTPATIENT
Start: 2019-03-18 | End: 2019-09-18 | Stop reason: SDUPTHER

## 2019-03-18 NOTE — PROGRESS NOTES
Verbal Order Read Back Per Read, Garret Vivar MD for Vitamin D 50,000 units 2 times a week for 3 months, # 24, PO, 0 refills on 3/18/2019 due to Vitamin D defiency. Prudence Muller Sr. verified correct name and  with PCP. Verbal Order Read Back Per Otilio, Garret Vivar MD for Vitamin D deficiency.  Prudence Muller Sr. verified correct name and  with PCP

## 2019-05-14 DIAGNOSIS — I10 HTN (HYPERTENSION), BENIGN: ICD-10-CM

## 2019-05-14 DIAGNOSIS — E78.5 DYSLIPIDEMIA: ICD-10-CM

## 2019-05-14 DIAGNOSIS — E55.9 VITAMIN D DEFICIENCY: ICD-10-CM

## 2019-05-14 RX ORDER — CHLORTHALIDONE 25 MG/1
TABLET ORAL
Qty: 30 TAB | Refills: 0 | Status: SHIPPED | OUTPATIENT
Start: 2019-05-14 | End: 2019-07-18 | Stop reason: SDUPTHER

## 2019-05-14 RX ORDER — EPLERENONE 50 MG/1
TABLET, FILM COATED ORAL
Qty: 30 TAB | Refills: 0 | Status: SHIPPED | OUTPATIENT
Start: 2019-05-14 | End: 2019-07-18 | Stop reason: SDUPTHER

## 2019-05-14 RX ORDER — PRAVASTATIN SODIUM 80 MG/1
TABLET ORAL
Qty: 90 TAB | Refills: 3 | Status: SHIPPED | OUTPATIENT
Start: 2019-05-14 | End: 2019-09-24 | Stop reason: SDUPTHER

## 2019-05-14 RX ORDER — AMLODIPINE BESYLATE 10 MG/1
TABLET ORAL
Qty: 30 TAB | Refills: 0 | Status: SHIPPED | OUTPATIENT
Start: 2019-05-14 | End: 2019-09-18 | Stop reason: SDUPTHER

## 2019-05-14 RX ORDER — ATENOLOL 25 MG/1
TABLET ORAL
Qty: 30 TAB | Refills: 0 | Status: SHIPPED | OUTPATIENT
Start: 2019-05-14 | End: 2019-09-18 | Stop reason: SDUPTHER

## 2019-05-14 RX ORDER — ERGOCALCIFEROL 1.25 MG/1
50000 CAPSULE ORAL 2 TIMES WEEKLY
Qty: 24 CAP | Refills: 0 | OUTPATIENT
Start: 2019-05-17

## 2019-05-14 NOTE — TELEPHONE ENCOUNTER
Requested Prescriptions     Pending Prescriptions Disp Refills    amLODIPine (NORVASC) 10 mg tablet 30 Tab 0     Sig: TAKE ONE TABLET BY MOUTH ONCE DAILY    atenolol (TENORMIN) 25 mg tablet 30 Tab 0     Sig: TAKE ONE TABLET BY MOUTH ONCE DAILY    chlorthalidone (HYGROTEN) 25 mg tablet 30 Tab 0     Sig: TAKE ONE TABLET BY MOUTH ONCE DAILY FOR BLOOD PRESSURE    eplerenone (INSPRA) 50 mg tab tablet 30 Tab 0     Sig: TAKE ONE TABLET BY MOUTH ONCE DAILY FOR BLOOD PRESSURE    ergocalciferol (ERGOCALCIFEROL) 50,000 unit capsule 24 Cap 0     Sig: Take 1 Cap by mouth two (2) times a week. Indications: vitamin D deficiency    pravastatin (PRAVACHOL) 80 mg tablet 30 Tab 0     Sig: TAKE ONE TABLET BY MOUTH ONCE DAILY     Patient called into the office stating that he needs a refill on all of his medication and he never picked up the Vit D medication from the pharmacy. Informed patient that he is due for an appointment, patient was suppose to come back in 3 weeks for a bp check. Patient stated that no one informed him of that and that he will have to call back in a day or so to schedule an appointment.

## 2019-05-14 NOTE — TELEPHONE ENCOUNTER
PCP: Shabana Aleajndro MD    Last appt: 3/7/2019  No future appointments. Requested Prescriptions     Pending Prescriptions Disp Refills    amLODIPine (NORVASC) 10 mg tablet 30 Tab 0     Sig: TAKE ONE TABLET BY MOUTH ONCE DAILY    atenolol (TENORMIN) 25 mg tablet 30 Tab 0     Sig: TAKE ONE TABLET BY MOUTH ONCE DAILY    chlorthalidone (HYGROTEN) 25 mg tablet 30 Tab 0     Sig: TAKE ONE TABLET BY MOUTH ONCE DAILY FOR BLOOD PRESSURE    eplerenone (INSPRA) 50 mg tab tablet 30 Tab 0     Sig: TAKE ONE TABLET BY MOUTH ONCE DAILY FOR BLOOD PRESSURE    ergocalciferol (ERGOCALCIFEROL) 50,000 unit capsule 24 Cap 0     Sig: Take 1 Cap by mouth two (2) times a week.  Indications: vitamin D deficiency    pravastatin (PRAVACHOL) 80 mg tablet 30 Tab 0     Sig: TAKE ONE TABLET BY MOUTH ONCE DAILY       Prior labs and Blood pressures:  BP Readings from Last 3 Encounters:   03/07/19 (!) 142/100   11/12/18 (!) 170/130   02/17/17 (!) 150/102     Lab Results   Component Value Date/Time    Sodium 146 (H) 03/07/2019 02:29 PM    Potassium 3.8 03/07/2019 02:29 PM    Chloride 102 03/07/2019 02:29 PM    CO2 30 (H) 03/07/2019 02:29 PM    Anion gap 7 05/31/2012 11:02 AM    Glucose 90 03/07/2019 02:29 PM    BUN 8 03/07/2019 02:29 PM    Creatinine 1.04 03/07/2019 02:29 PM    BUN/Creatinine ratio 8 (L) 03/07/2019 02:29 PM    GFR est AA 96 03/07/2019 02:29 PM    GFR est non-AA 83 03/07/2019 02:29 PM    Calcium 9.9 03/07/2019 02:29 PM     Lab Results   Component Value Date/Time    Hemoglobin A1c 5.4 09/19/2011 12:03 PM     Lab Results   Component Value Date/Time    Cholesterol, total 226 (H) 03/07/2019 02:29 PM    HDL Cholesterol 40 03/07/2019 02:29 PM    LDL, calculated 137 (H) 03/07/2019 02:29 PM    VLDL, calculated 49 (H) 03/07/2019 02:29 PM    Triglyceride 244 (H) 03/07/2019 02:29 PM    CHOL/HDL Ratio 4.3 08/09/2010 10:55 AM     Lab Results   Component Value Date/Time    VITAMIN D, 25-HYDROXY 9.7 (L) 03/07/2019 02:29 PM       Lab Results Component Value Date/Time    TSH 0.889 03/07/2019 02:29 PM

## 2019-07-18 DIAGNOSIS — I10 HTN (HYPERTENSION), BENIGN: ICD-10-CM

## 2019-07-19 NOTE — TELEPHONE ENCOUNTER
PCP: Basil Rahman MD    Last appt: 3/7/2019  No future appointments.     Requested Prescriptions     Pending Prescriptions Disp Refills    chlorthalidone (HYGROTEN) 25 mg tablet [Pharmacy Med Name: CHLORTHALIDONE 25MG    TAB] 30 Tab 0     Sig: TAKE 1 TABLET BY MOUTH ONCE DAILY FOR BLOOD PRESSURE    chlorthalidone (HYGROTEN) 25 mg tablet [Pharmacy Med Name: CHLORTHALIDONE 25MG    TAB] 30 Tab 0     Sig: TAKE 1 TABLET BY MOUTH ONCE DAILY FOR BLOOD PRESSURE    eplerenone (INSPRA) 50 mg tab tablet [Pharmacy Med Name: EPLERENONE 50MG TAB] 30 Tab 0     Sig: TAKE 1 TABLET BY MOUTH ONCE DAILY FOR BLOOD PRESSURE       Prior labs and Blood pressures:  BP Readings from Last 3 Encounters:   03/07/19 (!) 142/100   11/12/18 (!) 170/130   02/17/17 (!) 150/102     Lab Results   Component Value Date/Time    Sodium 146 (H) 03/07/2019 02:29 PM    Potassium 3.8 03/07/2019 02:29 PM    Chloride 102 03/07/2019 02:29 PM    CO2 30 (H) 03/07/2019 02:29 PM    Anion gap 7 05/31/2012 11:02 AM    Glucose 90 03/07/2019 02:29 PM    BUN 8 03/07/2019 02:29 PM    Creatinine 1.04 03/07/2019 02:29 PM    BUN/Creatinine ratio 8 (L) 03/07/2019 02:29 PM    GFR est AA 96 03/07/2019 02:29 PM    GFR est non-AA 83 03/07/2019 02:29 PM    Calcium 9.9 03/07/2019 02:29 PM     Lab Results   Component Value Date/Time    Hemoglobin A1c 5.4 09/19/2011 12:03 PM     Lab Results   Component Value Date/Time    Cholesterol, total 226 (H) 03/07/2019 02:29 PM    HDL Cholesterol 40 03/07/2019 02:29 PM    LDL, calculated 137 (H) 03/07/2019 02:29 PM    VLDL, calculated 49 (H) 03/07/2019 02:29 PM    Triglyceride 244 (H) 03/07/2019 02:29 PM    CHOL/HDL Ratio 4.3 08/09/2010 10:55 AM     Lab Results   Component Value Date/Time    VITAMIN D, 25-HYDROXY 9.7 (L) 03/07/2019 02:29 PM       Lab Results   Component Value Date/Time    TSH 0.889 03/07/2019 02:29 PM

## 2019-07-21 RX ORDER — EPLERENONE 50 MG/1
TABLET, FILM COATED ORAL
Qty: 90 TAB | Refills: 2 | Status: SHIPPED | OUTPATIENT
Start: 2019-07-21 | End: 2020-07-30

## 2019-07-21 RX ORDER — CHLORTHALIDONE 25 MG/1
TABLET ORAL
Qty: 90 TAB | Refills: 2 | Status: SHIPPED | OUTPATIENT
Start: 2019-07-21 | End: 2019-10-30 | Stop reason: ALTCHOICE

## 2019-07-21 RX ORDER — CHLORTHALIDONE 25 MG/1
TABLET ORAL
Qty: 30 TAB | Refills: 0 | OUTPATIENT
Start: 2019-07-21

## 2019-09-18 ENCOUNTER — OFFICE VISIT (OUTPATIENT)
Dept: FAMILY MEDICINE CLINIC | Age: 54
End: 2019-09-18

## 2019-09-18 VITALS
RESPIRATION RATE: 18 BRPM | HEART RATE: 64 BPM | DIASTOLIC BLOOD PRESSURE: 118 MMHG | TEMPERATURE: 98 F | HEIGHT: 69 IN | OXYGEN SATURATION: 99 % | SYSTOLIC BLOOD PRESSURE: 167 MMHG | BODY MASS INDEX: 34.05 KG/M2 | WEIGHT: 229.9 LBS

## 2019-09-18 DIAGNOSIS — M79.671 HEEL PAIN, BILATERAL: ICD-10-CM

## 2019-09-18 DIAGNOSIS — M79.672 HEEL PAIN, BILATERAL: ICD-10-CM

## 2019-09-18 DIAGNOSIS — E78.5 DYSLIPIDEMIA: ICD-10-CM

## 2019-09-18 DIAGNOSIS — Z23 ENCOUNTER FOR IMMUNIZATION: ICD-10-CM

## 2019-09-18 DIAGNOSIS — I10 HTN (HYPERTENSION), BENIGN: ICD-10-CM

## 2019-09-18 DIAGNOSIS — E55.9 VITAMIN D DEFICIENCY: ICD-10-CM

## 2019-09-18 DIAGNOSIS — Z00.00 PHYSICAL EXAM: Primary | ICD-10-CM

## 2019-09-18 DIAGNOSIS — R41.3 MILD MEMORY DISTURBANCE: ICD-10-CM

## 2019-09-18 RX ORDER — EPLERENONE 50 MG/1
TABLET, FILM COATED ORAL
Qty: 90 TAB | Refills: 2 | Status: CANCELLED | OUTPATIENT
Start: 2019-09-18

## 2019-09-18 RX ORDER — CHLORTHALIDONE 25 MG/1
TABLET ORAL
Qty: 90 TAB | Refills: 2 | Status: CANCELLED | OUTPATIENT
Start: 2019-09-18

## 2019-09-18 RX ORDER — ERGOCALCIFEROL 1.25 MG/1
50000 CAPSULE ORAL 2 TIMES WEEKLY
Qty: 24 CAP | Refills: 0 | Status: SHIPPED | OUTPATIENT
Start: 2019-09-20 | End: 2019-10-30 | Stop reason: ALTCHOICE

## 2019-09-18 RX ORDER — AMLODIPINE BESYLATE 10 MG/1
TABLET ORAL
Qty: 30 TAB | Refills: 0 | Status: SHIPPED | OUTPATIENT
Start: 2019-09-18 | End: 2019-11-12 | Stop reason: SDUPTHER

## 2019-09-18 RX ORDER — ATENOLOL 25 MG/1
TABLET ORAL
Qty: 30 TAB | Refills: 0 | Status: SHIPPED | OUTPATIENT
Start: 2019-09-18 | End: 2019-10-30 | Stop reason: ALTCHOICE

## 2019-09-18 RX ORDER — PRAVASTATIN SODIUM 80 MG/1
TABLET ORAL
Qty: 90 TAB | Refills: 3 | Status: CANCELLED | OUTPATIENT
Start: 2019-09-18

## 2019-09-18 NOTE — PROGRESS NOTES
Immunization/s administered 9/18/2019 by Mary Angeles with patient's consent. Patient tolerated procedure well. No reactions noted.

## 2019-09-18 NOTE — PROGRESS NOTES
Carmita Coulter . Identified pt with two pt identifiers(name and ). Chief Complaint   Patient presents with    Complete Physical    Labs       1. Have you been to the ER, urgent care clinic since your last visit? Hospitalized since your last visit? No    2. Have you seen or consulted any other health care providers outside of the [address] [place] [address] since your last visit? Include any pap smears or colon screening. No      Would you like to sign up for MyChart today, if you have not already done so? No  If not, would you like information on MyChart, and how to sign up at a later time? No      Medication reconciliation up to date and corrected with patient at this time. Today's provider has been notified of reason for visit, vitals and flowsheets obtained on patients. Reviewed record in preparation for visit, huddled with provider and have obtained necessary documentation.       Health Maintenance Due   Topic    Shingrix Vaccine Age 50> (1 of 2)    FOBT Q 1 YEAR AGE 54-65     DTaP/Tdap/Td series (2 - Td)    Influenza Age 5 to Adult        Wt Readings from Last 3 Encounters:   19 229 lb 14.4 oz (104.3 kg)   19 228 lb 14.4 oz (103.8 kg)   18 230 lb 12.8 oz (104.7 kg)     Temp Readings from Last 3 Encounters:   19 98 °F (36.7 °C) (Oral)   19 97.3 °F (36.3 °C) (Oral)   18 97.5 °F (36.4 °C) (Oral)     BP Readings from Last 3 Encounters:   19 (!) 167/118   19 (!) 142/100   18 (!) 170/130     Pulse Readings from Last 3 Encounters:   19 64   19 60   18 86     Vitals:    19 1502   BP: (!) 167/118   Pulse: 64   Resp: 18   Temp: 98 °F (36.7 °C)   TempSrc: Oral   SpO2: 99%   Weight: 229 lb 14.4 oz (104.3 kg)   Height: 5' 9\" (1.753 m)   PainSc:   6   PainLoc: Generalized         Learning Assessment:  :     Learning Assessment 2/3/2014   PRIMARY LEARNER Patient   HIGHEST LEVEL OF EDUCATION - PRIMARY LEARNER  DID NOT GRADUATE HIGH SCHOOL   BARRIERS PRIMARY LEARNER NONE   CO-LEARNER CAREGIVER No   PRIMARY LANGUAGE ENGLISH   LEARNER PREFERENCE PRIMARY VIDEOS   ANSWERED BY patient   RELATIONSHIP SELF       Depression Screening:  :     3 most recent PHQ Screens 3/7/2019   Little interest or pleasure in doing things Not at all   Feeling down, depressed, irritable, or hopeless Not at all   Total Score PHQ 2 0       Fall Risk Assessment:  :     Fall Risk Assessment, last 12 mths 3/7/2019   Able to walk? Yes   Fall in past 12 months? No       Abuse Screening:  :     Abuse Screening Questionnaire 3/7/2019 11/12/2018 2/17/2017   Do you ever feel afraid of your partner? N N N   Are you in a relationship with someone who physically or mentally threatens you? N N N   Is it safe for you to go home?  Y Y Y       ADL Screening:  :     ADL Assessment 3/7/2019   Feeding yourself No Help Needed   Getting from bed to chair No Help Needed   Getting dressed No Help Needed   Bathing or showering No Help Needed   Walk across the room (includes cane/walker) No Help Needed   Using the telphone No Help Needed   Taking your medications No Help Needed   Preparing meals No Help Needed   Managing money (expenses/bills) No Help Needed   Moderately strenuous housework (laundry) No Help Needed   Shopping for personal items (toiletries/medicines) No Help Needed   Shopping for groceries No Help Needed   Driving No Help Needed   Climbing a flight of stairs No Help Needed   Getting to places beyond walking distances No Help Needed
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
## 2019-09-23 ENCOUNTER — TELEPHONE (OUTPATIENT)
Dept: NEUROLOGY | Age: 54
End: 2019-09-23

## 2019-09-23 NOTE — TELEPHONE ENCOUNTER
----- Message from Janette Adkins sent at 9/23/2019 11:37 AM EDT -----  Regarding: Dr. Mckinley Lopez  General Message/Vendor Calls    Caller's first and last name:Minh Hernandez      Reason for call:new pt appt      Callback required yes/no and why:yes new appt appt      Best contact number(s): [phone]      Details to clarify the request: Pt would like a call to schedule a new pt appt.       Janette Adkins

## 2019-09-24 DIAGNOSIS — E78.5 DYSLIPIDEMIA: ICD-10-CM

## 2019-09-24 RX ORDER — PRAVASTATIN SODIUM 80 MG/1
TABLET ORAL
Qty: 90 TAB | Refills: 1 | Status: SHIPPED | OUTPATIENT
Start: 2019-09-24 | End: 2020-09-28 | Stop reason: SDUPTHER

## 2019-09-24 NOTE — TELEPHONE ENCOUNTER
PCP: Kaya Chen MD    Last appt: 9/18/2019  Future Appointments   Date Time Provider Bassam Rosales   10/30/2019  4:15 PM Jacey Yañez MD [facility]       Requested Prescriptions     Pending Prescriptions Disp Refills    pravastatin (PRAVACHOL) 80 mg tablet [Pharmacy Med Name: PRAVASTATIN 80MG    TAB] 30 Tab 0     Sig: TAKE 1 TABLET BY MOUTH ONCE DAILY       Prior labs and Blood pressures:  BP Readings from Last 3 Encounters:   09/18/19 (!) 167/118   03/07/19 (!) 142/100   11/12/18 (!) 170/130     Lab Results   Component Value Date/Time    Sodium 146 (H) 03/07/2019 02:29 PM    Potassium 3.8 03/07/2019 02:29 PM    Chloride 102 03/07/2019 02:29 PM    CO2 30 (H) 03/07/2019 02:29 PM    Anion gap 7 05/31/2012 11:02 AM    Glucose 90 03/07/2019 02:29 PM    BUN 8 03/07/2019 02:29 PM    Creatinine 1.04 03/07/2019 02:29 PM    BUN/Creatinine ratio 8 (L) 03/07/2019 02:29 PM    GFR est AA 96 03/07/2019 02:29 PM    GFR est non-AA 83 03/07/2019 02:29 PM    Calcium 9.9 03/07/2019 02:29 PM     Lab Results   Component Value Date/Time    Hemoglobin A1c 5.4 09/19/2011 12:03 PM     Lab Results   Component Value Date/Time    Cholesterol, total 226 (H) 03/07/2019 02:29 PM    HDL Cholesterol 40 03/07/2019 02:29 PM    LDL, calculated 137 (H) 03/07/2019 02:29 PM    VLDL, calculated 49 (H) 03/07/2019 02:29 PM    Triglyceride 244 (H) 03/07/2019 02:29 PM    CHOL/HDL Ratio 4.3 08/09/2010 10:55 AM     Lab Results   Component Value Date/Time    VITAMIN D, 25-HYDROXY 9.7 (L) 03/07/2019 02:29 PM       Lab Results   Component Value Date/Time    TSH 0.889 03/07/2019 02:29 PM

## 2019-10-08 ENCOUNTER — OFFICE VISIT (OUTPATIENT)
Dept: NEUROLOGY | Age: 54
End: 2019-10-08

## 2019-10-08 DIAGNOSIS — R41.3 MILD MEMORY DISTURBANCE: Primary | ICD-10-CM

## 2019-10-08 DIAGNOSIS — F32.9 MAJOR DEPRESSION, MELANCHOLIC TYPE: ICD-10-CM
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
## 2019-10-30 ENCOUNTER — OFFICE VISIT (OUTPATIENT)
Dept: FAMILY MEDICINE CLINIC | Age: 54
End: 2019-10-30

## 2019-10-30 VITALS
HEIGHT: 69 IN | RESPIRATION RATE: 20 BRPM | OXYGEN SATURATION: 98 % | DIASTOLIC BLOOD PRESSURE: 99 MMHG | WEIGHT: 234.2 LBS | TEMPERATURE: 97.5 F | SYSTOLIC BLOOD PRESSURE: 148 MMHG | HEART RATE: 70 BPM | BODY MASS INDEX: 34.69 KG/M2

## 2019-10-30 DIAGNOSIS — M76.61 TENDONITIS, ACHILLES, RIGHT: ICD-10-CM

## 2019-10-30 DIAGNOSIS — I10 ELEVATED BLOOD PRESSURE READING IN OFFICE WITH DIAGNOSIS OF HYPERTENSION: ICD-10-CM

## 2019-10-30 DIAGNOSIS — I10 ESSENTIAL HYPERTENSION: Primary | ICD-10-CM

## 2019-10-30 RX ORDER — ATENOLOL AND CHLORTHALIDONE TABLET 50; 25 MG/1; MG/1
1 TABLET ORAL DAILY
Qty: 30 TAB | Refills: 0 | Status: SHIPPED | OUTPATIENT
Start: 2019-10-30 | End: 2020-01-07

## 2019-10-30 NOTE — PROGRESS NOTES
Augustina Monterroso Sr. Identified pt with two pt identifiers(name and ). Chief Complaint   Patient presents with    Blood Pressure Check       1. Have you been to the ER, urgent care clinic since your last visit? Hospitalized since your last visit? No    2. Have you seen or consulted any other health care providers outside of the [address] [place] [address] since your last visit? Include any pap smears or colon screening. No      Would you like to sign up for MyChart today, if you have not already done so? No  If not, would you like information on MyChart, and how to sign up at a later time? No      Medication reconciliation up to date and corrected with patient at this time. Today's provider has been notified of reason for visit, vitals and flowsheets obtained on patients. Reviewed record in preparation for visit, huddled with provider and have obtained necessary documentation.       Health Maintenance Due   Topic    Shingrix Vaccine Age 50> (1 of 2)    FOBT Q 1 YEAR AGE 50-75     DTaP/Tdap/Td series (2 - Td)       Wt Readings from Last 3 Encounters:   10/30/19 234 lb 3.2 oz (106.2 kg)   19 229 lb 14.4 oz (104.3 kg)   19 228 lb 14.4 oz (103.8 kg)     Temp Readings from Last 3 Encounters:   10/30/19 97.5 °F (36.4 °C) (Oral)   19 98 °F (36.7 °C) (Oral)   19 97.3 °F (36.3 °C) (Oral)     BP Readings from Last 3 Encounters:   10/30/19 (!) 148/99   19 (!) 167/118   19 (!) 142/100     Pulse Readings from Last 3 Encounters:   10/30/19 70   19 64   19 60     Vitals:    10/30/19 1627   BP: (!) 148/99   Pulse: 70   Resp: 20   Temp: 97.5 °F (36.4 °C)   TempSrc: Oral   SpO2: 98%   Weight: 234 lb 3.2 oz (106.2 kg)   Height: 5' 9\" (1.753 m)   PainSc:   7   PainLoc: Generalized         Learning Assessment:  :     Learning Assessment 2/3/2014   PRIMARY LEARNER Patient   HIGHEST LEVEL OF EDUCATION - PRIMARY LEARNER  DID NOT GRADUATE HIGH SCHOOL   BARRIERS PRIMARY LEARNER NONE   CO-LEARNER CAREGIVER No   PRIMARY LANGUAGE ENGLISH   LEARNER PREFERENCE PRIMARY VIDEOS   ANSWERED BY patient   RELATIONSHIP SELF       Depression Screening:  :     3 most recent PHQ Screens 3/7/2019   Little interest or pleasure in doing things Not at all   Feeling down, depressed, irritable, or hopeless Not at all   Total Score PHQ 2 0       Fall Risk Assessment:  :     Fall Risk Assessment, last 12 mths 3/7/2019   Able to walk? Yes   Fall in past 12 months? No       Abuse Screening:  :     Abuse Screening Questionnaire 3/7/2019 11/12/2018 2/17/2017   Do you ever feel afraid of your partner? N N N   Are you in a relationship with someone who physically or mentally threatens you? N N N   Is it safe for you to go home?  Y Y Y       ADL Screening:  :     ADL Assessment 3/7/2019   Feeding yourself No Help Needed   Getting from bed to chair No Help Needed   Getting dressed No Help Needed   Bathing or showering No Help Needed   Walk across the room (includes cane/walker) No Help Needed   Using the telphone No Help Needed   Taking your medications No Help Needed   Preparing meals No Help Needed   Managing money (expenses/bills) No Help Needed   Moderately strenuous housework (laundry) No Help Needed   Shopping for personal items (toiletries/medicines) No Help Needed   Shopping for groceries No Help Needed   Driving No Help Needed   Climbing a flight of stairs No Help Needed   Getting to places beyond walking distances No Help Needed

## 2019-10-30 NOTE — PROGRESS NOTES
Here for BP f/u. Home BPs 140s/90s. Walks daily. Needs to improve diet. Say neuropsych for memory. F/u next month. Ortho dx right Achilles tendonitis and could do surgery on left but may not help. Patient denies chest pain, dyspnea, unexpected weight change, unexpected pain, mood or memory changes. Visit Vitals  BP (!) 148/99 (BP 1 Location: Left arm, BP Patient Position: Sitting)   Pulse 70   Temp 97.5 °F (36.4 °C) (Oral)   Resp 20   Ht 5' 9\" (1.753 m)   Wt 234 lb 3.2 oz (106.2 kg)   SpO2 98%   BMI 34.59 kg/m²     Patient alert and cooperative. Reviewed above. Assessment:  1. HTN with persistent elevated readings despite four meds. Plan:  1. Will set up cardiologist referral for further recommendations. 2. Will try combining Atenolol/Chlorthalidone to a single tablet to see if there is a price break. 3. Follow otherwise here prn.

## 2019-11-12 DIAGNOSIS — I10 HTN (HYPERTENSION), BENIGN: ICD-10-CM

## 2019-11-13 RX ORDER — AMLODIPINE BESYLATE 10 MG/1
TABLET ORAL
Qty: 90 TAB | Refills: 1 | Status: SHIPPED | OUTPATIENT
Start: 2019-11-13 | End: 2020-11-18

## 2019-11-13 NOTE — TELEPHONE ENCOUNTER
PCP: Karyn Sadler MD    Last appt: 10/30/2019  Future Appointments   Date Time Provider Bassam Rosales   11/18/2019  8:00 AM Kathleen Rooney,  [address]       Requested Prescriptions     Pending Prescriptions Disp Refills    amLODIPine (NORVASC) 10 mg tablet [Pharmacy Med Name: AMLODIPINE 10MG TAB] 30 Tab 0     Sig: TAKE 1 TABLET BY MOUTH ONCE DAILY       Prior labs and Blood pressures:  BP Readings from Last 3 Encounters:   10/30/19 (!) 148/99   09/18/19 (!) 167/118   03/07/19 (!) 142/100     Lab Results   Component Value Date/Time    Sodium 146 (H) 03/07/2019 02:29 PM    Potassium 3.8 03/07/2019 02:29 PM    Chloride 102 03/07/2019 02:29 PM    CO2 30 (H) 03/07/2019 02:29 PM    Anion gap 7 05/31/2012 11:02 AM    Glucose 90 03/07/2019 02:29 PM    BUN 8 03/07/2019 02:29 PM    Creatinine 1.04 03/07/2019 02:29 PM    BUN/Creatinine ratio 8 (L) 03/07/2019 02:29 PM    GFR est AA 96 03/07/2019 02:29 PM    GFR est non-AA 83 03/07/2019 02:29 PM    Calcium 9.9 03/07/2019 02:29 PM     Lab Results   Component Value Date/Time    Hemoglobin A1c 5.4 09/19/2011 12:03 PM     Lab Results   Component Value Date/Time    Cholesterol, total 226 (H) 03/07/2019 02:29 PM    HDL Cholesterol 40 03/07/2019 02:29 PM    LDL, calculated 137 (H) 03/07/2019 02:29 PM    VLDL, calculated 49 (H) 03/07/2019 02:29 PM    Triglyceride 244 (H) 03/07/2019 02:29 PM    CHOL/HDL Ratio 4.3 08/09/2010 10:55 AM     Lab Results   Component Value Date/Time    VITAMIN D, 25-HYDROXY 9.7 (L) 03/07/2019 02:29 PM       Lab Results   Component Value Date/Time    TSH 0.889 03/07/2019 02:29 PM

## 2020-01-03 DIAGNOSIS — I10 ESSENTIAL HYPERTENSION: ICD-10-CM

## 2020-01-03 DIAGNOSIS — I10 ELEVATED BLOOD PRESSURE READING IN OFFICE WITH DIAGNOSIS OF HYPERTENSION: ICD-10-CM

## 2020-01-07 RX ORDER — ATENOLOL AND CHLORTHALIDONE TABLET 50; 25 MG/1; MG/1
TABLET ORAL
Qty: 90 TAB | Refills: 0 | Status: SHIPPED | OUTPATIENT
Start: 2020-01-07 | End: 2020-09-29

## 2020-01-28 ENCOUNTER — TELEPHONE (OUTPATIENT)
Dept: FAMILY MEDICINE CLINIC | Age: 55
End: 2020-01-28

## 2020-01-28 NOTE — TELEPHONE ENCOUNTER
Olivia Schroeder is a 48 y.o. male  HIPAA verified by two patient identifiers. Patient advised to get refills from cardiologist, and he stated his first appointment with [address] [place] [address] is Jan 31, 2020, and medications do not need refilling yet.

## 2020-04-29 ENCOUNTER — TELEPHONE (OUTPATIENT)
Dept: FAMILY MEDICINE CLINIC | Age: 55
End: 2020-04-29

## 2020-04-29 DIAGNOSIS — E78.5 DYSLIPIDEMIA: ICD-10-CM

## 2020-04-29 DIAGNOSIS — Z12.5 PROSTATE CANCER SCREENING: ICD-10-CM

## 2020-04-29 DIAGNOSIS — I10 HTN (HYPERTENSION), BENIGN: ICD-10-CM

## 2020-04-29 DIAGNOSIS — E78.5 DYSLIPIDEMIA: Primary | ICD-10-CM

## 2020-04-29 DIAGNOSIS — E55.9 VITAMIN D DEFICIENCY: ICD-10-CM

## 2020-04-29 RX ORDER — ERGOCALCIFEROL 1.25 MG/1
CAPSULE ORAL
Qty: 24 CAP | Refills: 0 | OUTPATIENT
Start: 2020-04-29

## 2020-04-29 NOTE — TELEPHONE ENCOUNTER
Pt called and asked which lab they would like to go to so we could fax recent lab orders. pt asked why he needed labs and was notified it was to check his Vit D to see if he still needed the Requested prescription that was refused. Pt stated the [facility] location . Pt notified that the labs will be faxed over there. Pt stated that it might take him a few days. Pt thanked me and call was ended. Copied mailed as well.

## 2020-04-29 NOTE — TELEPHONE ENCOUNTER
Pt called, VM left stating that he had a prescription order refused because we needed labs from him. Pt notified that we have the labs put in and a list of labs he can choose to go to. Pt notified to call us back so we can fax those lab orders the the lab he chooses.

## 2020-10-02 DIAGNOSIS — E78.5 DYSLIPIDEMIA: ICD-10-CM

## 2020-10-02 RX ORDER — PRAVASTATIN SODIUM 80 MG/1
TABLET ORAL
Qty: 30 TAB | Refills: 0 | Status: CANCELLED | OUTPATIENT
Start: 2020-10-02

## 2020-11-16 DIAGNOSIS — E78.5 DYSLIPIDEMIA: ICD-10-CM

## 2020-11-16 DIAGNOSIS — I10 ESSENTIAL HYPERTENSION: ICD-10-CM

## 2020-11-16 DIAGNOSIS — I10 HTN (HYPERTENSION), BENIGN: ICD-10-CM

## 2020-11-16 DIAGNOSIS — I10 ELEVATED BLOOD PRESSURE READING IN OFFICE WITH DIAGNOSIS OF HYPERTENSION: ICD-10-CM

## 2020-11-18 RX ORDER — PRAVASTATIN SODIUM 80 MG/1
TABLET ORAL
Qty: 30 TAB | Refills: 0 | Status: SHIPPED | OUTPATIENT
Start: 2020-11-18 | End: 2020-12-28

## 2020-11-18 RX ORDER — EPLERENONE 50 MG/1
TABLET, FILM COATED ORAL
Qty: 30 TAB | Refills: 0 | Status: SHIPPED | OUTPATIENT
Start: 2020-11-18 | End: 2020-12-28

## 2020-11-18 RX ORDER — ATENOLOL AND CHLORTHALIDONE TABLET 50; 25 MG/1; MG/1
TABLET ORAL
Qty: 30 TAB | Refills: 0 | Status: SHIPPED | OUTPATIENT
Start: 2020-11-18 | End: 2020-12-28

## 2021-01-31 DIAGNOSIS — I10 HTN (HYPERTENSION), BENIGN: ICD-10-CM

## 2021-01-31 DIAGNOSIS — I10 ESSENTIAL HYPERTENSION: ICD-10-CM

## 2021-01-31 DIAGNOSIS — E78.5 DYSLIPIDEMIA: ICD-10-CM

## 2021-01-31 DIAGNOSIS — I10 ELEVATED BLOOD PRESSURE READING IN OFFICE WITH DIAGNOSIS OF HYPERTENSION: ICD-10-CM

## 2021-02-04 RX ORDER — ATENOLOL AND CHLORTHALIDONE TABLET 50; 25 MG/1; MG/1
1 TABLET ORAL DAILY
Qty: 30 TAB | Refills: 0 | Status: SHIPPED | OUTPATIENT
Start: 2021-02-04

## 2021-02-04 RX ORDER — EPLERENONE 50 MG/1
TABLET, FILM COATED ORAL
Qty: 30 TAB | Refills: 0 | Status: SHIPPED | OUTPATIENT
Start: 2021-02-04

## 2021-02-04 RX ORDER — AMLODIPINE BESYLATE 10 MG/1
TABLET ORAL
Qty: 30 TAB | Refills: 0 | Status: SHIPPED | OUTPATIENT
Start: 2021-02-04

## 2021-02-04 RX ORDER — PRAVASTATIN SODIUM 80 MG/1
TABLET ORAL
Qty: 30 TAB | Refills: 0 | Status: SHIPPED | OUTPATIENT
Start: 2021-02-04

## 2021-02-04 NOTE — TELEPHONE ENCOUNTER
PCP: Waleska Perez MD    Last appt: 10/30/2019  No future appointments. Requested Prescriptions     Pending Prescriptions Disp Refills    amLODIPine (NORVASC) 10 mg tablet [Pharmacy Med Name: amLODIPine Besylate 10 MG Oral Tablet] 30 Tab 0     Sig: Take 1 tablet by mouth once daily    eplerenone (INSPRA) 50 mg tab tablet [Pharmacy Med Name: Eplerenone 50 MG Oral Tablet] 30 Tab 0     Sig: Take 1 tablet by mouth once daily    atenoloL-chlorthalidone (TENORETIC) 50-25 mg per tablet [Pharmacy Med Name: Atenolol-Chlorthalidone 50-25 MG Oral Tablet] 30 Tab 0     Sig: Take 1 tablet by mouth once daily    pravastatin (PRAVACHOL) 80 mg tablet [Pharmacy Med Name: Pravastatin Sodium 80 MG Oral Tablet] 30 Tab 0     Sig: Take 1 tablet by mouth once daily     No visits with results within 3 Month(s) from this visit. Latest known visit with results is:   Office Visit on 03/07/2019   Component Date Value Ref Range Status    VITAMIN D, 25-HYDROXY 03/07/2019 9.7* 30.0 - 100.0 ng/mL Final    Comment: Vitamin D deficiency has been defined by the [address] practice guideline as a  level of serum 25-OH vitamin D less than 20 ng/mL (1,2). The Endocrine Society went on to further define vitamin D  insufficiency as a level between 21 and 29 ng/mL (2). 1. IOM ([place] of [employer]). 2010. Dietary reference     intakes for calcium and D. [facility]: The     [employer]. 2. Meera MF, Rakesh ZARAGOZA, Alexandre ESTEBAN, et al.     Evaluation, treatment, and prevention of vitamin D     deficiency: an Endocrine Society clinical practice     guideline. JCEM. 2011 Jul; 96(7):1911-30.       WBC 03/07/2019 7.5  3.4 - 10.8 x10E3/uL Final    RBC 03/07/2019 4.87  4.14 - 5.80 x10E6/uL Final    HGB 03/07/2019 13.3  13.0 - 17.7 g/dL Final    HCT 03/07/2019 39.2  37.5 - 51.0 % Final    MCV 03/07/2019 81  79 - 97 fL Final    MCH 03/07/2019 27.3  26.6 - 33.0 pg Final    MCHC 03/07/2019 33.9  31.5 - 35.7 g/dL Final    RDW 03/07/2019 15.3  12.3 - 15.4 % Final    PLATELET 66/88/9442 318  150 - 379 x10E3/uL Final    NEUTROPHILS 03/07/2019 63  Not Estab. % Final    Lymphocytes 03/07/2019 27  Not Estab. % Final    MONOCYTES 03/07/2019 7  Not Estab. % Final    EOSINOPHILS 03/07/2019 3  Not Estab. % Final    BASOPHILS 03/07/2019 0  Not Estab. % Final    ABS. NEUTROPHILS 03/07/2019 4.7  1.4 - 7.0 x10E3/uL Final    Abs Lymphocytes 03/07/2019 2.0  0.7 - 3.1 x10E3/uL Final    ABS. MONOCYTES 03/07/2019 0.5  0.1 - 0.9 x10E3/uL Final    ABS. EOSINOPHILS 03/07/2019 0.2  0.0 - 0.4 x10E3/uL Final    ABS. BASOPHILS 03/07/2019 0.0  0.0 - 0.2 x10E3/uL Final    IMMATURE GRANULOCYTES 03/07/2019 0  Not Estab. % Final    ABS. IMM. GRANS. 03/07/2019 0.0  0.0 - 0.1 x10E3/uL Final    TSH 03/07/2019 0.889  0.450 - 4.500 uIU/mL Final    Prostate Specific Ag 03/07/2019 1.2  0.0 - 4.0 ng/mL Final    Comment: Roche ECLIA methodology. According to the American Urological Association, Serum PSA should  decrease and remain at undetectable levels after radical  prostatectomy. The AUA defines biochemical recurrence as an initial  PSA value 0.2 ng/mL or greater followed by a subsequent confirmatory  PSA value 0.2 ng/mL or greater. Values obtained with different assay methods or kits cannot be used  interchangeably. Results cannot be interpreted as absolute evidence  of the presence or absence of malignant disease.       Glucose 03/07/2019 90  65 - 99 mg/dL Final    BUN 03/07/2019 8  6 - 24 mg/dL Final    Creatinine 03/07/2019 1.04  0.76 - 1.27 mg/dL Final    GFR est non-AA 03/07/2019 83  >59 mL/min/1.73 Final    GFR est AA 03/07/2019 96  >59 mL/min/1.73 Final    BUN/Creatinine ratio 03/07/2019 8* 9 - 20 Final    Sodium 03/07/2019 146* 134 - 144 mmol/L Final    Potassium 03/07/2019 3.8  3.5 - 5.2 mmol/L Final    Chloride 03/07/2019 102  96 - 106 mmol/L Final    CO2 03/07/2019 30* 20 - 29 mmol/L Final    Calcium 03/07/2019 9.9  8.7 - 10.2 mg/dL Final    Protein, total 03/07/2019 7.3  6.0 - 8.5 g/dL Final    Albumin 03/07/2019 4.4  3.5 - 5.5 g/dL Final    GLOBULIN, TOTAL 03/07/2019 2.9  1.5 - 4.5 g/dL Final    A-G Ratio 03/07/2019 1.5  1.2 - 2.2 Final    Bilirubin, total 03/07/2019 <0.2  0.0 - 1.2 mg/dL Final    Alk. phosphatase 03/07/2019 80  39 - 117 IU/L Final    AST (SGOT) 03/07/2019 24  0 - 40 IU/L Final    ALT (SGPT) 03/07/2019 27  0 - 44 IU/L Final    Cholesterol, total 03/07/2019 226* 100 - 199 mg/dL Final    Triglyceride 03/07/2019 244* 0 - 149 mg/dL Final    HDL Cholesterol 03/07/2019 40  >39 mg/dL Final    VLDL, calculated 03/07/2019 49* 5 - 40 mg/dL Final    LDL, calculated 03/07/2019 137* 0 - 99 mg/dL Final    INTERPRETATION 03/07/2019 Note   Final    Supplemental report is available.

## 2021-02-05 NOTE — TELEPHONE ENCOUNTER
Two pt identifiers confirmed. Informed pt of med refills and practice closing. Pt verbalized understanding of information discussed w/ no further questions at this time.

## 2021-02-05 NOTE — TELEPHONE ENCOUNTER
Please notify pt he has not been seen or had labs since 2019. Also, [facility] is closing. Please establish with a new practice for future refills. This will be last refill for #30 days.

## 2022-03-18 PROBLEM — R41.3 MILD MEMORY DISTURBANCE: Status: ACTIVE | Noted: 2019-09-18

## 2022-03-19 PROBLEM — M79.672 HEEL PAIN, BILATERAL: Status: ACTIVE | Noted: 2019-09-18

## 2022-03-19 PROBLEM — M79.671 HEEL PAIN, BILATERAL: Status: ACTIVE | Noted: 2019-09-18

## 2022-08-01 ENCOUNTER — NURSE TRIAGE (OUTPATIENT)
Dept: OTHER | Facility: CLINIC | Age: 57
End: 2022-08-01

## 2022-08-01 NOTE — TELEPHONE ENCOUNTER
Received call from Arthur Merino at [facility] with Red Flag Complaint. Subjective: Caller states \"has right knee swelling and has been out of bp meds for 1 month. \"     Current Symptoms: see above    Onset: 3 days ago; improving    Associated Symptoms: NA    Pain Severity: 4/10; pain and sharp; intermittent    Temperature: denies fever     What has been tried: Tylenol    LMP: NA Pregnant: NA    Recommended disposition: See PCP within 3 Days    Care advice provided, patient verbalizes understanding; denies any other questions or concerns; instructed to call back for any new or worsening symptoms. Patient/Caller agrees with recommended disposition; writer provided warm transfer to Angela Rivera at [facility] for appointment scheduling    Attention Provider: Thank you for allowing me to participate in the care of your patient. The patient was connected to triage in response to information provided to the [facility]. Please do not respond through this encounter as the response is not directed to a shared pool.       Reason for Disposition   [1] MODERATE pain (e.g., interferes with normal activities, limping) AND [2] present > 3 days    Protocols used: Knee Swelling-ADULT-

## 2023-01-16 ENCOUNTER — OFFICE VISIT (OUTPATIENT)
Dept: INTERNAL MEDICINE CLINIC | Age: 58
End: 2023-01-16

## 2023-01-16 DIAGNOSIS — Z00.00 PHYSICAL EXAM: ICD-10-CM

## 2023-01-16 DIAGNOSIS — E66.9 OBESITY (BMI 30.0-34.9): ICD-10-CM

## 2023-01-16 DIAGNOSIS — I10 ESSENTIAL HYPERTENSION: ICD-10-CM

## 2023-01-16 DIAGNOSIS — Z23 ENCOUNTER FOR IMMUNIZATION: ICD-10-CM

## 2023-01-16 DIAGNOSIS — N40.0 BENIGN PROSTATIC HYPERPLASIA WITHOUT LOWER URINARY TRACT SYMPTOMS: ICD-10-CM

## 2023-01-16 DIAGNOSIS — I10 HTN (HYPERTENSION), BENIGN: Primary | ICD-10-CM

## 2023-01-16 DIAGNOSIS — G89.4 CHRONIC PAIN SYNDROME: ICD-10-CM

## 2023-01-16 DIAGNOSIS — E78.5 DYSLIPIDEMIA: ICD-10-CM

## 2023-01-16 RX ORDER — PRAVASTATIN SODIUM 80 MG/1
TABLET ORAL
Qty: 30 TABLET | Refills: 0 | Status: CANCELLED | OUTPATIENT
Start: 2023-01-16

## 2023-01-16 RX ORDER — AMLODIPINE BESYLATE 10 MG/1
TABLET ORAL
Qty: 30 TABLET | Refills: 0 | Status: CANCELLED | OUTPATIENT
Start: 2023-01-16

## 2023-01-16 RX ORDER — PRAVASTATIN SODIUM 80 MG/1
TABLET ORAL
Qty: 30 TABLET | Refills: 0 | Status: SHIPPED | OUTPATIENT
Start: 2023-01-16

## 2023-01-16 RX ORDER — ATENOLOL AND CHLORTHALIDONE TABLET 50; 25 MG/1; MG/1
1 TABLET ORAL DAILY
Qty: 30 TABLET | Refills: 0 | Status: SHIPPED | OUTPATIENT
Start: 2023-01-16

## 2023-01-16 RX ORDER — EPLERENONE 50 MG/1
TABLET, FILM COATED ORAL
Qty: 30 TABLET | Refills: 0 | Status: CANCELLED | OUTPATIENT
Start: 2023-01-16

## 2023-01-16 RX ORDER — ATENOLOL AND CHLORTHALIDONE TABLET 50; 25 MG/1; MG/1
1 TABLET ORAL DAILY
Qty: 30 TABLET | Refills: 0 | Status: CANCELLED | OUTPATIENT
Start: 2023-01-16

## 2023-01-16 RX ORDER — AMLODIPINE BESYLATE 10 MG/1
TABLET ORAL
Qty: 30 TABLET | Refills: 0 | Status: SHIPPED | OUTPATIENT
Start: 2023-01-16

## 2023-01-16 RX ORDER — EPLERENONE 50 MG/1
TABLET, FILM COATED ORAL
Qty: 30 TABLET | Refills: 0 | Status: SHIPPED | OUTPATIENT
Start: 2023-01-16
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
## 2023-01-17 LAB
ALBUMIN SERPL-MCNC: 4 G/DL (ref 3.8–4.9)
ALBUMIN/CREAT UR: 118 MG/G CREAT (ref 0–29)
ALBUMIN/GLOB SERPL: 1.7 {RATIO} (ref 1.2–2.2)
ALP SERPL-CCNC: 94 IU/L (ref 44–121)
ALT SERPL-CCNC: 32 IU/L (ref 0–44)
APO B SERPL-MCNC: 133 MG/DL
APPEARANCE UR: CLEAR
AST SERPL-CCNC: 17 IU/L (ref 0–40)
BACTERIA #/AREA URNS HPF: ABNORMAL /[HPF]
BASOPHILS # BLD AUTO: 0.1 X10E3/UL (ref 0–0.2)
BASOPHILS NFR BLD AUTO: 1 %
BILIRUB SERPL-MCNC: 0.7 MG/DL (ref 0–1.2)
BILIRUB UR QL STRIP: NEGATIVE
BUN SERPL-MCNC: 12 MG/DL (ref 6–24)
BUN/CREAT SERPL: 8 (ref 9–20)
CALCIUM SERPL-MCNC: 8.9 MG/DL (ref 8.7–10.2)
CASTS URNS QL MICRO: ABNORMAL /LPF
CHLORIDE SERPL-SCNC: 102 MMOL/L (ref 96–106)
CHOLEST SERPL-MCNC: 226 MG/DL (ref 100–199)
CO2 SERPL-SCNC: 27 MMOL/L (ref 20–29)
COLOR UR: YELLOW
CREAT SERPL-MCNC: 1.49 MG/DL (ref 0.76–1.27)
CREAT UR-MCNC: 290.9 MG/DL
CRP SERPL HS-MCNC: 11.82 MG/L (ref 0–3)
CRYSTALS URNS MICRO: ABNORMAL
EGFR: 55 ML/MIN/1.73
EOSINOPHIL # BLD AUTO: 0.1 X10E3/UL (ref 0–0.4)
EOSINOPHIL NFR BLD AUTO: 1 %
EPI CELLS #/AREA URNS HPF: ABNORMAL /HPF (ref 0–10)
ERYTHROCYTE [DISTWIDTH] IN BLOOD BY AUTOMATED COUNT: 15.6 % (ref 11.6–15.4)
GLOBULIN SER CALC-MCNC: 2.3 G/DL (ref 1.5–4.5)
GLUCOSE SERPL-MCNC: 97 MG/DL (ref 70–99)
GLUCOSE UR QL STRIP: NEGATIVE
HCT VFR BLD AUTO: 36.9 % (ref 37.5–51)
HDLC SERPL-MCNC: 36 MG/DL
HGB BLD-MCNC: 11.3 G/DL (ref 13–17.7)
HGB UR QL STRIP: NEGATIVE
IMM GRANULOCYTES # BLD AUTO: 0 X10E3/UL (ref 0–0.1)
IMM GRANULOCYTES NFR BLD AUTO: 0 %
KETONES UR QL STRIP: ABNORMAL
LDLC SERPL CALC-MCNC: 170 MG/DL (ref 0–99)
LEUKOCYTE ESTERASE UR QL STRIP: NEGATIVE
LYMPHOCYTES # BLD AUTO: 1 X10E3/UL (ref 0.7–3.1)
LYMPHOCYTES NFR BLD AUTO: 8 %
MCH RBC QN AUTO: 23.4 PG (ref 26.6–33)
MCHC RBC AUTO-ENTMCNC: 30.6 G/DL (ref 31.5–35.7)
MCV RBC AUTO: 77 FL (ref 79–97)
MICRO URNS: ABNORMAL
MICROALBUMIN UR-MCNC: 344.3 UG/ML
MONOCYTES # BLD AUTO: 0.8 X10E3/UL (ref 0.1–0.9)
MONOCYTES NFR BLD AUTO: 7 %
NEUTROPHILS # BLD AUTO: 9.8 X10E3/UL (ref 1.4–7)
NEUTROPHILS NFR BLD AUTO: 83 %
NITRITE UR QL STRIP: NEGATIVE
PH UR STRIP: 6.5 [PH] (ref 5–7.5)
PLATELET # BLD AUTO: 287 X10E3/UL (ref 150–450)
POTASSIUM SERPL-SCNC: 3.2 MMOL/L (ref 3.5–5.2)
PROT SERPL-MCNC: 6.3 G/DL (ref 6–8.5)
PROT UR QL STRIP: ABNORMAL
PSA SERPL-MCNC: 1.2 NG/ML (ref 0–4)
RBC # BLD AUTO: 4.82 X10E6/UL (ref 4.14–5.8)
RBC #/AREA URNS HPF: ABNORMAL /HPF (ref 0–2)
SODIUM SERPL-SCNC: 141 MMOL/L (ref 134–144)
SP GR UR STRIP: 1.02 (ref 1–1.03)
TRIGL SERPL-MCNC: 112 MG/DL (ref 0–149)
UNIDENT CRYS URNS QL MICRO: PRESENT
UROBILINOGEN UR STRIP-MCNC: 1 MG/DL (ref 0.2–1)
VLDLC SERPL CALC-MCNC: 20 MG/DL (ref 5–40)
WBC # BLD AUTO: 11.8 X10E3/UL (ref 3.4–10.8)
WBC #/AREA URNS HPF: ABNORMAL /HPF (ref 0–5)

## 2023-01-22 VITALS
HEIGHT: 69 IN | RESPIRATION RATE: 20 BRPM | BODY MASS INDEX: 31.52 KG/M2 | WEIGHT: 212.8 LBS | SYSTOLIC BLOOD PRESSURE: 200 MMHG | TEMPERATURE: 98.3 F | DIASTOLIC BLOOD PRESSURE: 136 MMHG | OXYGEN SATURATION: 96 % | HEART RATE: 100 BPM

## 2023-01-22 PROBLEM — E66.9 OBESITY (BMI 30.0-34.9): Status: ACTIVE | Noted: 2023-01-22

## 2023-01-22 PROBLEM — E66.811 OBESITY (BMI 30.0-34.9): Status: ACTIVE | Noted: 2023-01-22

## 2023-02-17 ENCOUNTER — TELEPHONE (OUTPATIENT)
Dept: INTERNAL MEDICINE CLINIC | Age: 58
End: 2023-02-17

## 2023-02-17 NOTE — TELEPHONE ENCOUNTER
----- Message from Lanny Bowser MD sent at 2/2/2023 10:53 PM EST -----  Return to office for CBC ,TIBC, ferritin ,hemoglobin fractionated

## 2023-02-17 NOTE — TELEPHONE ENCOUNTER
----- Message from Jeanette Franklin MD sent at 2/2/2023 10:55 PM EST -----  Start rosuvastatin 10 mg daily to replace pravastatin assuming patient has never had this before

## 2023-02-22 ENCOUNTER — CLINICAL SUPPORT (OUTPATIENT)
Dept: INTERNAL MEDICINE CLINIC | Age: 58
End: 2023-02-22

## 2023-02-22 DIAGNOSIS — D64.9 ANEMIA, UNSPECIFIED TYPE: Primary | ICD-10-CM

## 2023-02-23 LAB
BASOPHILS # BLD: 0.1 K/UL (ref 0–0.1)
BASOPHILS NFR BLD: 1 % (ref 0–1)
DIFFERENTIAL METHOD BLD: ABNORMAL
EOSINOPHIL # BLD: 0.3 K/UL (ref 0–0.4)
EOSINOPHIL NFR BLD: 3 % (ref 0–7)
ERYTHROCYTE [DISTWIDTH] IN BLOOD BY AUTOMATED COUNT: 17.9 % (ref 11.5–14.5)
FERRITIN SERPL-MCNC: 69 NG/ML (ref 26–388)
HCT VFR BLD AUTO: 40 % (ref 36.6–50.3)
HGB BLD-MCNC: 11.1 G/DL (ref 12.1–17)
IMM GRANULOCYTES # BLD AUTO: 0 K/UL (ref 0–0.04)
IMM GRANULOCYTES NFR BLD AUTO: 0 % (ref 0–0.5)
IRON SATN MFR SERPL: 8 % (ref 20–50)
IRON SERPL-MCNC: 30 UG/DL (ref 35–150)
LYMPHOCYTES # BLD: 1.3 K/UL (ref 0.8–3.5)
LYMPHOCYTES NFR BLD: 12 % (ref 12–49)
MCH RBC QN AUTO: 21.3 PG (ref 26–34)
MCHC RBC AUTO-ENTMCNC: 27.8 G/DL (ref 30–36.5)
MCV RBC AUTO: 76.9 FL (ref 80–99)
MONOCYTES # BLD: 0.8 K/UL (ref 0–1)
MONOCYTES NFR BLD: 7 % (ref 5–13)
NEUTS SEG # BLD: 8.3 K/UL (ref 1.8–8)
NEUTS SEG NFR BLD: 77 % (ref 32–75)
NRBC # BLD: 0 K/UL (ref 0–0.01)
NRBC BLD-RTO: 0 PER 100 WBC
PLATELET # BLD AUTO: 293 K/UL (ref 150–400)
PMV BLD AUTO: 11 FL (ref 8.9–12.9)
RBC # BLD AUTO: 5.2 M/UL (ref 4.1–5.7)
RBC MORPH BLD: ABNORMAL
TIBC SERPL-MCNC: 364 UG/DL (ref 250–450)
WBC # BLD AUTO: 10.8 K/UL (ref 4.1–11.1)

## 2023-02-23 RX ORDER — ROSUVASTATIN CALCIUM 20 MG/1
20 TABLET, COATED ORAL
Qty: 30 TABLET | Refills: 11 | Status: SHIPPED | OUTPATIENT
Start: 2023-02-23

## 2023-03-07 ENCOUNTER — OFFICE VISIT (OUTPATIENT)
Dept: INTERNAL MEDICINE CLINIC | Age: 58
End: 2023-03-07
Payer: COMMERCIAL

## 2023-03-07 VITALS
OXYGEN SATURATION: 98 % | BODY MASS INDEX: 32.48 KG/M2 | SYSTOLIC BLOOD PRESSURE: 150 MMHG | TEMPERATURE: 98 F | WEIGHT: 219.3 LBS | HEART RATE: 77 BPM | HEIGHT: 69 IN | DIASTOLIC BLOOD PRESSURE: 103 MMHG | RESPIRATION RATE: 20 BRPM

## 2023-03-07 DIAGNOSIS — E78.5 DYSLIPIDEMIA: ICD-10-CM

## 2023-03-07 DIAGNOSIS — I12.9 HYPERTENSIVE NEPHROSCLEROSIS, STAGE 1 THROUGH STAGE 4 OR UNSPECIFIED CHRONIC KIDNEY DISEASE: ICD-10-CM

## 2023-03-07 DIAGNOSIS — E66.9 OBESITY (BMI 30.0-34.9): ICD-10-CM

## 2023-03-07 DIAGNOSIS — I10 ESSENTIAL HYPERTENSION: Primary | ICD-10-CM

## 2023-03-07 DIAGNOSIS — D64.9 ANEMIA, UNSPECIFIED TYPE: ICD-10-CM

## 2023-03-07 PROCEDURE — 99214 OFFICE O/P EST MOD 30 MIN: CPT | Performed by: INTERNAL MEDICINE

## 2023-03-07 PROCEDURE — 3077F SYST BP >= 140 MM HG: CPT | Performed by: INTERNAL MEDICINE

## 2023-03-07 PROCEDURE — 3080F DIAST BP >= 90 MM HG: CPT | Performed by: INTERNAL MEDICINE

## 2023-03-07 RX ORDER — OLMESARTAN MEDOXOMIL 20 MG/1
20 TABLET ORAL DAILY
Qty: 30 TABLET | Refills: 3 | Status: SHIPPED | OUTPATIENT
Start: 2023-03-07

## 2023-03-07 RX ORDER — POTASSIUM CHLORIDE 750 MG/1
10 TABLET, FILM COATED, EXTENDED RELEASE ORAL DAILY
COMMUNITY
Start: 2023-01-27

## 2023-03-07 RX ORDER — INDOMETHACIN 50 MG/1
CAPSULE ORAL
COMMUNITY
Start: 2023-01-27

## 2023-03-07 NOTE — PROGRESS NOTES
Chief Complaint   Patient presents with    Hypertension    Cholesterol Problem     YES Answers must have Comments  1. \"Have you been to the ER, urgent care clinic since your last visit? Hospitalized since your last visit? \"    [x] YES When about a month ago, Where [facility], and Reason for visit foot pain  [] NO       2. Have you seen or consulted any other health care providers outside of [address] [place] [address] since your last visit?     [] YES   [x] NO       3. For patients aged 39-70: Have you had a colorectal cancer screening such as a colonoscopy/FIT/Cologuard? Nurse/CMA to request records if not in chart   [] YES   [x] NO   [] NA, based on age    If the patient is female:      4. For female patients aged 41-77: Nimco Vashti you had a mammogram in the last two years?  Nurse/CMA to request records if not in chart   [] YES   [] NO   [] NA, based on age    11. For female patients aged 21-65: Nimco Vashti you had a pap smear?   Nurse/CMA to request records if not in chart   [] YES   [] NO  [] NA, based on age

## 2023-03-08 NOTE — PROGRESS NOTES
[address] [facility]  [place] [address]  [address] [place] [address]  Phone:  [phone]  Fax: [phone]       Chief Complaint   Patient presents with    Hypertension    Cholesterol Problem   . SUBJECTIVE:    Judith Bourne is a 56 y.o. male        Current Outpatient Medications   Medication Sig Dispense Refill    potassium chloride SR (KLOR-CON 10) 10 mEq tablet Take 10 mEq by mouth daily. indomethacin (INDOCIN) 50 mg capsule TAKE 1 TABLET BY MOUTH THREE TIMES DAILY WITH FOOD OR MILK AS NEEDED FOR PAIN      olmesartan (BENICAR) 20 mg tablet Take 1 Tablet by mouth daily. 30 Tablet 3    rosuvastatin (CRESTOR) 20 mg tablet Take 1 Tablet by mouth nightly.  30 Tablet 11    atenoloL-chlorthalidone (TENORETIC) 50-25 mg per tablet TAKE 1 TABLET BY MOUTH ONCE DAILY *NEED TO ESTABLISH WITH A NEW PRACTICE FOR FUTURE REFILLS* 30 Tablet 11    amLODIPine (NORVASC) 10 mg tablet TAKE 1 TABLET BY MOUTH ONCE DAILY *NEED TO ESTABLISH WITH NEW PRACTICE DUE TO [facility] CLOSING* 30 Tablet 11    eplerenone (INSPRA) 50 mg tab tablet TAKE 1 TABLET BY MOUTH ONCE DAILY *NEED TO ESTABLISH WITH A NEW PRACTICE FOR FUTURE REFILLS* 30 Tablet 11     Past Medical History:   Diagnosis Date    Advanced care planning/counseling discussion 8/9/16    Chronic pain     back    Dyslipidemia     high cholesterol    Hypertension     Unspecified adverse effect of anesthesia     sleep apnea/not diagnosed/snores    Vitamin D deficiency 10/17/2014     Past Surgical History:   Procedure Laterality Date    HX COLONOSCOPY      early 46s    HX ORTHOPAEDIC  09/2011    stimulator Emily More 85    umbilical hernia     Allergies   Allergen Reactions    Amoxicillin Rash    Augmentin [Amoxicillin-Pot Clavulanate] Rash    Coconut Rash    Pineapple Swelling         REVIEW OF SYSTEMS:  General: negative for - chills or fever  ENT: negative for - headaches, nasal congestion or tinnitus  Respiratory: negative for - cough, hemoptysis, shortness of breath or wheezing  Cardiovascular : negative for - chest pain, edema, palpitations or shortness of breath  Gastrointestinal: negative for - abdominal pain, blood in stools, heartburn or nausea/vomiting  Genito-Urinary: no dysuria, trouble voiding, or hematuria  Musculoskeletal: negative for - gait disturbance, joint pain, joint stiffness or joint swelling  Neurological: no TIA or stroke symptoms  Hematologic: no bruises, no bleeding, no swollen glands  Integument: no lumps, mole changes, nail changes or rash  Endocrine: no malaise/lethargy or unexpected weight changes      Social History     Socioeconomic History    Marital status:     Number of children: 4    Years of education: 10   Occupational History    Occupation: [employer]: full time live in in [facility]    Occupation: [employer]   Tobacco Use    Smoking status: Former     Packs/day: 1.00     Years: 6.00     Pack years: 6.00     Types: Cigarettes     Quit date: 1989     Years since quittin.7    Smokeless tobacco: Never   Substance and Sexual Activity    Alcohol use: Yes     Comment: Occasionally, once a year, 1-2 beers at a time    Drug use: No    Sexual activity: Yes     Partners: Female     Birth control/protection: None     Comment: monogamous with wife since about    Other Topics Concern     Service No    Back Care Yes   Social History Narrative    Unemployed, 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     Family History   Problem Relation Age of Onset    Cancer Father         prostate CA - unknown    Arthritis-rheumatoid Mother     OSTEOARTHRITIS Mother     Heart Disease Mother 76        MI    Hypertension Brother         51     Heart Attack Brother 48       OBJECTIVE:    Visit Vitals  BP (!) 150/103 (BP 1 Location: Left upper arm, BP Patient Position: Sitting)   Pulse 77   Temp 98 °F (36.7 °C) (Oral)   Resp 20   Ht 5' 9\" (1.753 m)   Wt 219 lb 4.8 oz (99.5 kg)   SpO2 98%   BMI 32.38 kg/m²     CONSTITUTIONAL: well , well nourished, appears age appropriate  EYES: perrla, eom intact  ENMT:moist mucous membranes, pharynx clear  NECK: supple. Thyroid normal  RESPIRATORY: Chest: clear to ascultation and percussion   CARDIOVASCULAR: Heart: regular rate and rhythm  GASTROINTESTINAL: Abdomen: soft, bowel sounds active  HEMATOLOGIC: no pathological lymph nodes palpated  MUSCULOSKELETAL: Extremities: no edema, pulse 1+   INTEGUMENT: No unusual rashes or suspicious skin lesions noted. Nails appear normal.  NEUROLOGIC: non-focal exam   MENTAL STATUS: alert and oriented, appropriate affect      ASSESSMENT:  1. Essential hypertension    2. Hypertensive nephrosclerosis, stage 1 through stage 4 or unspecified chronic kidney disease    3. Obesity (BMI 30.0-34.9)    4. Dyslipidemia    5. Anemia, unspecified type        PLAN:   Dictation on: 03/07/2023  9:19 PM by: Thelma Obrien [[id number]]     Orders Placed This Encounter    CBC WITH AUTOMATED DIFF    PROTEIN ELECTROPHORESIS    METABOLIC PANEL, BASIC    CREATININE, UR, RANDOM    PROTEIN URINE, RANDOM ([facility] Only)    potassium chloride SR (KLOR-CON 10) 10 mEq tablet    indomethacin (INDOCIN) 50 mg capsule    olmesartan (BENICAR) 20 mg tablet         Follow-up and Dispositions    Return in about 4 weeks (around 4/4/2023).            Mell Velez MD

## 2023-03-08 NOTE — PROGRESS NOTES
1. The patient has severe hypertension. His blood pressure improved significantly, but he is still not optimally controlled. My concern is the fact that he has end-organ damage with mild renal dysfunction suggestive of hypertensive nephrosclerosis. Ideally he needs to probably be on a ATA blocker which might be of added benefit above and beyond blood pressure control. I will place him on olmesartan 20 mg q.d. to be titrated upward. His ideal goal would be a systolic of less than 807.  2. He does need to minimize weight gain. This can be accomplished by eating meals, eliminating snacks, and avoiding the consumption of processed carbohydrates. 3. He has significant dyslipidemia suggesting atherogenic lipoprotein phenotype. This would therefore suggest significant insulin resistance. In any event, I placed him on rosuvastatin because he was previously on pravastatin which was obviously not the optimal class of statin that he should be on. Lipid values will be checked in the future. 4. Finally he does have a modest anemia not well characterized.   I will continue to evaluate this with appropriate labs and he might need to see a hematologist.

## 2023-03-22 RX ORDER — PREDNISONE 20 MG/1
20 TABLET ORAL 3 TIMES DAILY
Qty: 21 TABLET | Refills: 0 | Status: SHIPPED | OUTPATIENT
Start: 2023-03-22 | End: 2023-03-29

## 2023-03-23 LAB
ALBUMIN SERPL ELPH-MCNC: 3.7 G/DL (ref 2.9–4.4)
ALBUMIN/GLOB SERPL: 1 {RATIO} (ref 0.7–1.7)
ALPHA1 GLOB SERPL ELPH-MCNC: 0.2 G/DL (ref 0–0.4)
ALPHA2 GLOB SERPL ELPH-MCNC: 0.6 G/DL (ref 0.4–1)
B-GLOBULIN SERPL ELPH-MCNC: 1.3 G/DL (ref 0.7–1.3)
BASOPHILS # BLD AUTO: 0.1 X10E3/UL (ref 0–0.2)
BASOPHILS NFR BLD AUTO: 1 %
BUN SERPL-MCNC: 26 MG/DL (ref 6–24)
BUN/CREAT SERPL: 18 (ref 9–20)
CALCIUM SERPL-MCNC: 9.4 MG/DL (ref 8.7–10.2)
CHLORIDE SERPL-SCNC: 103 MMOL/L (ref 96–106)
CO2 SERPL-SCNC: 25 MMOL/L (ref 20–29)
CREAT SERPL-MCNC: 1.44 MG/DL (ref 0.76–1.27)
EGFRCR SERPLBLD CKD-EPI 2021: 57 ML/MIN/1.73
EOSINOPHIL # BLD AUTO: 0.2 X10E3/UL (ref 0–0.4)
EOSINOPHIL NFR BLD AUTO: 3 %
ERYTHROCYTE [DISTWIDTH] IN BLOOD BY AUTOMATED COUNT: 18.3 % (ref 11.6–15.4)
GAMMA GLOB SERPL ELPH-MCNC: 1.5 G/DL (ref 0.4–1.8)
GLOBULIN SER CALC-MCNC: 3.6 G/DL (ref 2.2–3.9)
GLUCOSE SERPL-MCNC: 73 MG/DL (ref 70–99)
HCT VFR BLD AUTO: 36.2 % (ref 37.5–51)
HGB BLD-MCNC: 10.9 G/DL (ref 13–17.7)
IMM GRANULOCYTES # BLD AUTO: 0 X10E3/UL (ref 0–0.1)
IMM GRANULOCYTES NFR BLD AUTO: 1 %
LABORATORY COMMENT REPORT: NORMAL
LYMPHOCYTES # BLD AUTO: 1.8 X10E3/UL (ref 0.7–3.1)
LYMPHOCYTES NFR BLD AUTO: 23 %
M PROTEIN SERPL ELPH-MCNC: NORMAL G/DL
MCH RBC QN AUTO: 22.4 PG (ref 26.6–33)
MCHC RBC AUTO-ENTMCNC: 30.1 G/DL (ref 31.5–35.7)
MCV RBC AUTO: 74 FL (ref 79–97)
MONOCYTES # BLD AUTO: 0.6 X10E3/UL (ref 0.1–0.9)
MONOCYTES NFR BLD AUTO: 8 %
NEUTROPHILS # BLD AUTO: 5 X10E3/UL (ref 1.4–7)
NEUTROPHILS NFR BLD AUTO: 64 %
PLATELET # BLD AUTO: 282 X10E3/UL (ref 150–450)
POTASSIUM SERPL-SCNC: 3.4 MMOL/L (ref 3.5–5.2)
PROT SERPL-MCNC: 7.3 G/DL (ref 6–8.5)
RBC # BLD AUTO: 4.87 X10E6/UL (ref 4.14–5.8)
SODIUM SERPL-SCNC: 142 MMOL/L (ref 134–144)
WBC # BLD AUTO: 7.8 X10E3/UL (ref 3.4–10.8)

## 2023-03-31 RX ORDER — POTASSIUM CHLORIDE 750 MG/1
20 TABLET, FILM COATED, EXTENDED RELEASE ORAL DAILY
Qty: 60 TABLET | Refills: 11 | Status: SHIPPED | OUTPATIENT
Start: 2023-03-31

## 2023-04-05 ENCOUNTER — TELEPHONE (OUTPATIENT)
Dept: INTERNAL MEDICINE CLINIC | Age: 58
End: 2023-04-05

## 2023-04-05 NOTE — TELEPHONE ENCOUNTER
----- Message from Jahaira Rizvi MD sent at 3/31/2023  4:15 PM EDT -----  Patient is to see a gastroenterologist because he is anemic

## 2023-04-05 NOTE — TELEPHONE ENCOUNTER
Patient notified and given Dr. Bonilla Orf name and number to schedule a colon and upper endoscopy because he is anemic per Dr. Poncho Hemphill

## 2023-04-06 ENCOUNTER — TELEPHONE (OUTPATIENT)
Dept: INTERNAL MEDICINE CLINIC | Age: 58
End: 2023-04-06

## 2023-04-06 NOTE — TELEPHONE ENCOUNTER
----- Message from Delmer Scott MD sent at 3/31/2023  4:15 PM EDT -----  Patient is to see a gastroenterologist because he is anemic Medical Necessity:    Fabienne Cali is a 61year old female with low back pain. This is occurring nearly on a daily basis and causes disability as described below. Failed therapy with pain medications including anticonvulsants, antidepressants, and NSAIDs has occurred. The patient is not able to partake in their  activities of daily living; as such the patient's ability to function has been limited. Patient denies bowel/bladder dysfunction, saddle anesthesia, or loss of motor control of the lower extremities. Interval Hx:  Patient continues to have low back pain. The pain radiates to the hips and buttock. She had over 90% reduction in pain for four hours. The pain is moderate at the moment. The patient reports she can lift only light weights. The patient reports pain prevents her from walking more than 100 yards. The patient reports pain prevents her from standing more than 10 minutes. The patient has been off of Plavix for 7 days and was advised to resume it in 12 hours.         Past Medical History   Diagnosis Date   â¢ Anemia    â¢ Anxiety    â¢ AVNRT (AV adali re-entry tachycardia) 9/26/2014     Status post ablation for AVNRT by Dr. Beth Geller in [facility] [facility]   â¢ CAD (coronary artery disease), native coronary artery 9/26/2014     Status post stent 9/3/2014, Repeat stent 1/16/2015   â¢ Cervical radiculopathy    â¢ Chronic diarrhea      On Lomotil   â¢ Chronic fatigue 8/27/2015   â¢ CKD (chronic kidney disease), stage III    â¢ Congestive cardiac failure    â¢ Depression    â¢ Diabetic retinopathy    â¢ Diverticulosis    â¢ Esophageal reflux    â¢ Essential (primary) hypertension    â¢ Heart palpitations 9/27/2016   â¢ HLD (hyperlipidemia)    â¢ [place] ([place] hearing)      Right Ear, No Hearing Aide   â¢ Hypothyroidism    â¢ IDDM (insulin dependent diabetes mellitus)      Has Insulin Pump, Checks Blood Sugars 6X/Day   â¢ Inflammatory bowel disease    â¢ Myocardial infarction 01/16/2015     NSTEMI   â¢ Obesity      Ht 67 in.    wt. 218   â¢ Obstructive sleep apnea      Stopped using CPAP 2 months ago. Dr núñez. â¢ Osteopenia    â¢ Other chronic pain      back, neck   â¢ RA (rheumatoid arthritis)    â¢ Staphylococcus aureus infection    â¢ SVT (supraventricular tachycardia)    â¢ Unspecified otitis media      70 % hearing loss rt ear  2003   â¢ Unspecified sinusitis (chronic)      perforated nasal septum during insertion of n/g tube in ER    â¢ Vasovagal syncope    â¢ Wears glasses        Blood pressure 179/84, pulse 76, resp. rate 16, SpO2 100 %. ALLERGIES:   Allergen Reactions   â¢ Xyzal Other (See Comments)     Nightmares    â¢ Statins MYALGIA     Severe muscle pain   â¢ Tape [Adhesive] Other (See Comments)     Removes skin when tape removed (plastic or adhesive). Needs PAPER tape or hypoallergenic   â¢ Tizanidine DEPRESSION   â¢ Tramadol DIARRHEA and Nausea & Vomiting     chills   â¢ Sulfa Drugs Cross Reactors RASH       Current Outpatient Prescriptions   Medication Sig Dispense Refill   â¢ HYDROcodone-acetaminophen (NORCO) 5-325 MG per tablet Take 1 tablet by mouth 2 times daily as needed for Pain. 15 tablet 0   â¢ paricalcitol (ZEMPLAR) 1 MCG capsule Take 1 capsule by mouth three times a week. 30 capsule 3   â¢ metaxalone (SKELAXIN) 800 MG tablet Take 1 tablet by mouth 3 times daily. 90 tablet 2   â¢ clonazePAM (KLONOPIN) 0.5 MG tablet TAKE 1 TABLET AT BEDTIME AS NEEDED FOR RESTLESS LEGS 30 tablet 0   â¢ clopidogrel (PLAVIX) 75 MG tablet TAKE ONE TABLET BY MOUTH DAILY 30 tablet 9   â¢ amLODIPine (NORVASC) 5 MG tablet Take 1 tablet by mouth daily. 30 tablet 11   â¢ escitalopram (LEXAPRO) 20 MG tablet TAKE ONE AND ONE-HALF TABLETS BY MOUTH DAILY 45 tablet 4   â¢ DISPENSE Estradiol 0.5 mg and testosterone 1 mg per capsule - instructions take one daily. Gets Qty: 30 at a time with 4 refills. Called in to [facility], [address]  30 capsule 4   â¢ ZETIA 10 MG tablet TAKE ONE TABLET BY MOUTH DAILY 30 tablet 5   â¢ diphenoxylate-atropine (LOMOTIL) "2.5-0.025 MG tablet TAKE UP TO 6 TABLETS BY MOUTH DAILY 180 tablet 0   â¢ auranofin (RIDAURA) 3 MG capsule Take 1 capsule by mouth 2 times daily. Indications: Rheumatoid Arthritis 60 capsule 3   â¢ NITROSTAT 0.4 MG SL tablet PLACE 1 TABLET UNDER THE TONGUE EVERY 5 MINUTES AS NEEDED FOR CHEST PA IN. CALL  911 IF PAIN PERSISTS AFTER 25 tablet 0   â¢ furosemide (LASIX) 20 MG tablet TAKE ONE TABLET BY MOUTH DAILY 30 tablet 11   â¢ pramipexole (MIRAPEX) 0.25 MG tablet TAKE 1 TABLET BY MOUTH 3 TIMES DAILY. 90 tablet 6   â¢ Insulin Syringes, Disposable, U-100 1 ML Misc Use TID as directed 100 each 3   â¢ levothyroxine (SYNTHROID, LEVOTHROID) 50 MCG tablet TAKE ONE TABLET BY MOUTH DAILY 30 tablet 11   â¢ BYSTOLIC 2.5 MG tablet TAKE ONE TABLET BY MOUTH TWICE DAILY 60 tablet 10   â¢ NOVOLOG 100 UNIT/ML injection USE WITH INSULIN PUMP 30 mL 10   â¢ disopyramide (NORPACE) 100 MG capsule TAKE ONE CAPSULE BY MOUTH TWICE DAILY 60 capsule 5   â¢ montelukast (SINGULAIR) 10 MG tablet TAKE ONE TABLET BY MOUTH AT BEDTIME 30 tablet 6   â¢ acetaminophen-codeine (TYLENOL #3) 300-30 MG per tablet Take 1 tablet by mouth every 4 hours as needed (LBP). Indications: Mild to Moderate Pain 60 tablet 0   â¢ codeine 30 MG tablet Take 15 mg by mouth nightly. â¢ Difluprednate (DUREZOL OP) Place into left eye 2 times daily. â¢ CycloSPORINE (RESTASIS OP) Place into both eyes 2 times daily. â¢ omeprazole (PRILOSEC) 20 MG capsule Take 20 mg by mouth daily. â¢ Propylene Glycol 0.6 % Solution Place 1 drop into both eyes as needed. Indications: Extremely Dry Eyes     â¢ Dispense (CHECK, UNKNOWN CONCENTRATION) Testoster 1 mg / estradiol 0.5 mg 1 Capsule daily 90 capsule 1   â¢ Cholecalciferol (VITAMIN D-3 PO) Take 1 tablet by mouth daily. â¢ aspirin 325 MG EC tablet Take 325 mg by mouth daily. â¢ dorzolamide-timolol (COSOPT) 22.3-6.8 MG/ML ophthalmic solution Place 1 drop into both eyes 2 times daily. â¢ DISPENSE Insulin syringes 30 g x 1/2 "".  To " use for insulin injection if pump is not working. 1 Container 1   â¢ albuterol-ipratropium 2.5 mg/0.5 mg (DUONEB) 0.5-2.5 (3) MG/3ML nebulizer solution Take 3 mLs by nebulization every 6 hours as needed for Wheezing. 360 mL 5   â¢ CALCIUM CARBONATE-VITAMIN D PO Take 1 tablet by mouth daily. â¢ vitamin - therapeutic multivitamins w/minerals (CENTRUM SILVER,THERA-M) Tab Take 1 tablet by mouth daily. â¢ Bimatoprost (LUMIGAN OP) Apply 1 drop to eye nightly. BOTH EYES     â¢ glucagon (GLUCAGON EMERGENCY) 1 MG injection Inject 1 mL as directed once as needed (Hypoglycemia). 1 each 12     Current Facility-Administered Medications   Medication Dose Route Frequency Provider Last Rate Last Dose   â¢ sodium chloride 0.9% infusion   Intravenous Continuous Keven Buenrostro  mL/hr at 02/22/17 1217     â¢ sodium chloride (PF) 0.9 % injection 2 mL  2 mL Injection 2 times per day Keven Buenrostro MD       â¢ sodium chloride (PF) 0.9 % injection 2 mL  2 mL Injection PRN Keven Buenrostro MD       â¢ lidocaine HCl (XYLOCAINE) 1 % injection   Injection PRREESE Buenrostro MD   10 mL at 02/22/17 1255   â¢ bupivacaine PF (SENSORCAINE-MPF) 0.25 % injection   Infiltration PRREESE Buenrostro MD   6 mL at 02/22/17 1255   â¢ lidocaine HCl 2 % injection   Injection PRREESE Buenrostro MD   2 mL at 02/22/17 1256   â¢ MIDAZolam (VERSED) injection    PRREESE Buenrostro MD   1 mg at 02/22/17 1304   â¢ fentaNYL (SUBLIMAZE) injection    RYLEY Buenrostro MD   25 mcg at 02/22/17 1307       MRI LUMBAR SPINE:  Results for orders placed during the hospital encounter of 05/10/16   MRI Lumbar Spine    Narrative EXAM: MRI LUMBAR SPINE [facility]. HISTORY: LOW BACK PAIN, PRIOR LUMBAR SURGERY. COMPARISON: Prior MRI 03/18/2009. Radiographs 3/24/2009 and 3/6/2009. No  available recent dedicated lumbar spine imaging. Correlation to CT of the  abdomen and pelvis of 12/02/2014. TECHNIQUE/FINDINGS: Noncontrast MRI of the lumbar spine.      For counting purposes, there are assumed to be 5 lumbar-type vertebral  bodies. Correlation should be made to available entire spine imaging prior  to surgical or procedural intervention. The conus terminates at L1. Otherwise unremarkable lumbar spine alignment. Normal vertebral body  heights and conformation. Again noted posterior pedicle screw and socrates fusion on the left at L5-S1,  with interbody spacer device (better seen on the prior CT). Abnormal  appearing articulation of the left-sided facets at L5-S1, possibly  postoperative and/or developmental, similar to the prior CT from 2014. Metal artifact mild to moderately degrades image quality about the fused  segments. Moderate spondylotic degenerative changes lower lumbar spine mostly at the  L4 interspace, the level above the fused segments, worse compared to 2009. Individual levels will be discussed below. T11-12: Mild anterior hypertrophic endplate degenerative changes. Slight  chronic anterior wedging of the T12 vertebral body. L1-L2: Unremarkable. L2-L3: Minimal posterior broad-based disc bulge. Prominent ligamentum  flavum hypertrophy. Mild spinal canal narrowing, new. Disc bulge and  ligamentum flavum hypertrophic changes produce mild bilateral L2-3 neural  foraminal narrowing. L3-L4: Minimal broad-based disc bulge. No significant spinal canal  narrowing. Disc bulge and ligamentum flavum and facet hypertrophic  degenerative changes produce mild bilateral L3-4 neural foraminal  narrowing. L4-L5: Mild interspace narrowing. Mild to moderate posterior broad-based  disc bulge, more eccentric posteriorly on the right with probable focal  disc protrusion/extrusion extending towards the neural foramen. Moderate to  marked spinal canal narrowing. Moderate right and mild left L4-5 neural  foraminal narrowing. L5-S1: Postoperative changes in the left. No significant spinal canal  narrowing.  Disc osteophyte complex posteriorly on the left extends towards  the neural foramen. Discussed by comp comes within close proximity and/or  contacts the adjacent traversing left S1 nerve roots, and probably the  exiting left L5 nerve roots. Minimal right and moderate to marked left  L5-S1 neural foraminal narrowing, obscured by the metal artifact. Impression IMPRESSION:    1. Again noted postoperative changes in the left at L5-S1. Associated  metal artifact. 2.  Moderate spondylotic degenerative changes lower lumbar spine, worse at  the L4 interspace, the level above the fused segments. 3.  Varying degrees of spinal canal narrowing, with new mild spinal canal  narrowing at the L2 interspace, and moderate to marked spinal canal  narrowing at the L4 interspace, both worse compared to previous. 4.  Varying degrees of neural foraminal narrowing, most prominent on the  right at L4-5 with moderate narrowing, and probably on the left at L5-S1  with moderate to marked narrowing. Other levels as above. Please clinically  correlate with any radicular symptoms. Scribed by Patricia Monsalve for services rendered by Dr. Joseph Louis. Physical examination, history taking, interpretation of all imaging, reviewing the above, and medical decision making were performed by Dr. Joseph Louis. Interval Hx:  The patient has what appears to be facetogenic lumbar pain as evidenced by the two diagnostic lumbar medial branch blocks which showed approximately % relief for the duration of action of local anesthetic. At this time, the patient presents for more long-term benefit. As such, they are presenting today for a radiofrequency ablation of the medial branch nerves. On examination the patient is tender to palpation over the bilateral facet joints at the L2-3, L3/4, L4/5,  facet joints. The topographical pattern of their pain is localized to the regions referred by these facet joints.   The patient has facet loading maneuvers that are positive, namely extension, lateral rotation, and side bending which does reproduce pain. Gen: Aox3  Oswestry Disability Index= 48%  Airway examined and findings in chart  CTA-B, no chest retractions noted  S1S2 Audible  No Pedal Edema    Additionally, the patient has been n.p.o. since midnight. The patient is requesting sedation for anxiolysis and comfort. The independent trained observer to assist with monitoring of the patient is a RN. The patient's pain score today is a 9/10. PREOPERATIVE DIAGNOSIS:  Lumbar facetogenic pain  Lumbar spondylosis  Low back pain  Post fusion pain syndrome       POSTOPERATIVE DIAGNOSIS:  Lumbar facetogenic pain  Lumbar spondylosis  Low back pain  Post fusion pain syndrome     PROCEDURE TITLE:  Lumbar Radiofrequency ablation of lumbar facets L2,3,4 medial branches on the bilateral side    EBL:  <1 ml      DESCRIPTION OF PROCEDURE:  All risks, benefits, alternatives of the procedure were discussed with the patient in great detail. These included and was not limited to infection, bleeding, hematoma, nerve damage, paralysis, bowel or bladder dysfunction, increase in pain and the procedure not working. The patient was explained all of this in lay person's terms and expressed understanding. The patient was brought into the procedure room. The patient's lumbar and sacral regions were prepped with Chlorhexidine. Next, while everybody in the room was wearing masks and caps, and I was wearing a sterile gown and sterile gloves the patient's entire lumbar sacral region and body were draped with sterile towels exposing only the site of care. These gloves were then discarded and a new pair worn. Of note, strict attention was noted to aseptic technique during the entire procedure. Cardiopulmonary monitoring, including O2 saturation, Heart Rate, EKG, and blood pressure monitoring took place during the entirety of the procedure and O2 was administerd via NC throughout the entire procedure. Additionally, the patient was able to respond purposely to verbal commands throughout the procedure. Before any RFA took place, the person was asked whether they were alert and oriented to time, place, and person. The patient was able to answer appropriately prior to any lesioning that took place. Next, a 27-gauge needle was used to anesthetize the skin over the left L2-3 facet joint with 1 ml of 1% lidocaine. Then a curved tip 20-gauge radiofrequency cannula with a 10mm active tip was advanced towards where the L2MBB typically lies. The needle was seen to be at the junction of the TP and superior articulating process. Sensory stimulation at 50 Hz, at 0.3 volts demonstrated concordant pain. Then, motor stimulation was undertaken up to 3 volts with no radicular type symptoms felt by the patient and no movement palpated by me in the lower extremity. The patient stated that the majority of what they felt was a thumping in the lower back region only. Movement of the multifidus muscle was also appreciated. At this point, 1 mL of 2% Lidocaine was injected through the cannula after AP, contra lateral oblique, and lateral imaging confirmed again no needle placement in the neural foramen. Next, a 27-gauge needle was used to anesthetize the skin over the left L3/4 facet joint with 1 ml of 1% lidocaine. Then a curved tip 20-gauge radiofrequency cannula with a 10mm active tip was advanced towards where the L3MBB typically lies. The needle was seen to be at the junction of the TP and superior articulating process. Sensory stimulation at 50 Hz, at 0.3 volts demonstrated concordant pain. Then, motor stimulation was undertaken up to 3 volts with no radicular type symptoms felt by the patient and no movement palpated by me in the lower extremity. The patient stated that the majority of what they felt was a thumping in the lower back region only. Movement of the multifidus muscle was also appreciated.   At this point, 1 mL of 2% Lidocaine was injected through the cannula after AP, contra lateral oblique, and lateral imaging confirmed again no needle placement in the neural foramen. Next, a 27-gauge needle was used to anesthetize the skin over the left L4/5 facet joint with 1ml of 1% lidocaine. Then a curved tip 20-gauge radiofrequency cannula with a 10mm active tip was advanced towards where the L4MBB typically lies. The needle was seen to be at the junction of the TP and superior articulating process. Sensory stimulation at 50 Hz, at 0.3 volts demonstrated concordant pain. Then, motor stimulation was undertaken up to 3 volts with no radicular type symptoms felt by the patient and no movement palpated by me in the lower extremity. The patient stated that the majority of what they felt was a thumping in the lower back region only. Movement of the multifidus muscle was also appreciated. At this point, 1 mL of 2% Lidocaine was injected through the cannula after AP, contra lateral oblique, and lateral imaging confirmed again no needle placement in the neural foramen. Next, a 27-gauge needle was used to anesthetize the skin over the right L2-3 facet/sacral ala joint with 1ml of 1% lidocaine. Then a curved tip 20-gauge radiofrequency cannula with a 10mm active tip was advanced towards where the L5 DRG typically lies. The needle was seen to be at the sacral ala. Sensory stimulation at 50 Hz, at 0.3 volts demonstrated concordant pain. Then, motor stimulation was undertaken up to 3 volts with no radicular type symptoms felt by the patient and no movement palpated by me in the lower extremity. The patient stated that the majority of what they felt was a thumping in the lower back region only. Movement of the multifidus muscle was also appreciated.   At this point, 1 mL of 2% Lidocaine was injected through the cannula after AP, contra lateral oblique, and lateral imaging confirmed again  no needle placement in the neural foramen. Next, a 27-gauge needle was used to anesthetize the skin over the right L3-4 facet with 1ml of 1% lidocaine. Then a curved tip 20-gauge radiofrequency cannula with a 10mm active tip was advanced towards where the L3 MBB typically lies. The needle was seen to be at the sacral ala. Sensory stimulation at 50 Hz, at 0.3 volts demonstrated concordant pain. Then, motor stimulation was undertaken up to 3 volts with no radicular type symptoms felt by the patient and no movement palpated by me in the lower extremity. The patient stated that the majority of what they felt was a thumping in the lower back region only. Movement of the multifidus muscle was also appreciated. At this point, 1 mL of 2% Lidocaine was injected through the cannula after AP, contra lateral oblique, and lateral imaging confirmed again  no needle placement in the neural foramen. Next, a 27-gauge needle was used to anesthetize the skin over the right L4/5 facet joint with 1ml of 1% lidocaine. Then a curved tip 20-gauge radiofrequency cannula with a 10mm active tip was advanced towards where the L4MBB typically lies. The needle was seen to be at the junction of the TP and superior articulating process. Sensory stimulation at 50 Hz, at 0.3 volts demonstrated concordant pain. Then, motor stimulation was undertaken up to 3 volts with no radicular type symptoms felt by the patient and no movement palpated by me in the lower extremity. The patient stated that the majority of what they felt was a thumping in the lower back region only. Movement of the multifidus muscle was also appreciated. At this point, 1 mL of 2% Lidocaine was injected through the cannula after AP, contra lateral oblique, and lateral imaging confirmed again no needle placement in the neural foramen. After these needles were placed. I again confirmed on AP, lateral, and contralateral imaging that the needles were appropriately situated and were outside the foramina. " The lidocaine was allowed to work for about 90sec. Then  Radiofrequency ablation took place at 80 degrees Celsius for 120 seconds. After this was undertaken, the radiofrequency cannula of each of these needles were individually under live fluroscopy moved , again both AP and lateral imaging showed proper placement and no placement in the foramina. Motor stimulation to 3 volts was undertaken for each of these needles. Again, there was no radicular type symptoms noted by the patient or movement felt by me while palpating the patient's lower extremity. The patient only expressed concordant ""thumping\"" pain in their lower back and multifidus movement was noted. At this point another lesion was made at 80 degrees Celsius for approximately 120 seconds. Next, 5 mg of preservative-free methylprednisolone was injected through each cannula and then the cannula was removed while injecting 0.5ml of 0.25% Bupivacaine. Sterile bandages were placed over all wound sites. Impedance Readings: Left  L2 MBB  237 Ohms  L3 MBB  278 Ohms  L4 MBB  245 Ohms    Right:  L2 MBB  237 Ohms  L3 MBB  245 Ohms  L4 MBB  276 Ohms    Total sedative medications that were titrated in judiciously during the procedure included:  3 mg Midazolam  150 mcg Fentanyl  Approximately 22 minutes was spent for sedation. Total of 2 facets on the left 2 facets on the right were denervated today    POSTOPERATIVE OUTCOMES:  The patient tolerated this procedure well. They had 5/5 strength of their bilateral lower extremities. The patient had 0/10 pain in their lower back region on the side that was worked on today. The patient was advised to place an ice pack on the wound and alternate 30min on and off for the remainder of the day. Patient was able to ambulate without any difficulities and was monitored for more than 30min without any difficulities.     The patient was informed if they felt any increased pain, had decreased ability to move their " arms or legs, fever, chills, nausea, bowel or bladder dysfunction and/or vomiting they should go immediately to the nearest Emergency Room or call 911 to obtain emergency medical services. The patient expressed understanding of this and has agreed to make a followup visit. Jose Dodd MD  Fellowship Trained, Pain Management  Department of Interventional Pain Management    The documentation recorded by the scribe accurately and completely reflects the service(s) IJose MD,  personally performed and the decisions made by me.

## 2023-04-16 PROBLEM — M10.9 GOUT: Status: ACTIVE | Noted: 2023-04-16

## 2023-04-19 RX ORDER — PREDNISONE 20 MG/1
20 TABLET ORAL 3 TIMES DAILY
Qty: 21 TABLET | Refills: 0 | Status: SHIPPED | OUTPATIENT
Start: 2023-04-19 | End: 2023-04-26

## 2023-04-27 ENCOUNTER — TELEPHONE (OUTPATIENT)
Dept: INTERNAL MEDICINE CLINIC | Age: 58
End: 2023-04-27

## 2023-04-27 NOTE — TELEPHONE ENCOUNTER
----- Message from Jacques Rooney MD sent at 4/25/2023  8:52 PM EDT -----  Remind patient that he cannot take indomethacin.

## 2023-08-08 RX ORDER — OLMESARTAN MEDOXOMIL 20 MG/1
TABLET ORAL
Qty: 30 TABLET | Refills: 11 | Status: SHIPPED | OUTPATIENT
Start: 2023-08-08

## 2023-08-14 ENCOUNTER — OFFICE VISIT (OUTPATIENT)
Facility: CLINIC | Age: 58
End: 2023-08-14
Payer: COMMERCIAL

## 2023-08-14 VITALS
DIASTOLIC BLOOD PRESSURE: 84 MMHG | BODY MASS INDEX: 35.49 KG/M2 | HEIGHT: 69 IN | WEIGHT: 239.6 LBS | TEMPERATURE: 97.9 F | OXYGEN SATURATION: 96 % | RESPIRATION RATE: 14 BRPM | HEART RATE: 60 BPM | SYSTOLIC BLOOD PRESSURE: 136 MMHG

## 2023-08-14 DIAGNOSIS — I12.9 HYPERTENSIVE NEPHROSCLEROSIS, STAGE 1 THROUGH STAGE 4 OR UNSPECIFIED CHRONIC KIDNEY DISEASE: ICD-10-CM

## 2023-08-14 DIAGNOSIS — D64.9 ANEMIA, UNSPECIFIED TYPE: ICD-10-CM

## 2023-08-14 DIAGNOSIS — E78.5 DYSLIPIDEMIA: ICD-10-CM

## 2023-08-14 DIAGNOSIS — I10 ESSENTIAL HYPERTENSION: Primary | ICD-10-CM

## 2023-08-14 PROCEDURE — 99214 OFFICE O/P EST MOD 30 MIN: CPT | Performed by: INTERNAL MEDICINE

## 2023-08-14 PROCEDURE — 3078F DIAST BP <80 MM HG: CPT | Performed by: INTERNAL MEDICINE

## 2023-08-14 PROCEDURE — 3074F SYST BP LT 130 MM HG: CPT | Performed by: INTERNAL MEDICINE

## 2023-08-14 SDOH — ECONOMIC STABILITY: HOUSING INSECURITY: IN THE LAST 12 MONTHS, HOW MANY PLACES HAVE YOU LIVED?: 1

## 2023-08-14 SDOH — ECONOMIC STABILITY: TRANSPORTATION INSECURITY
IN THE PAST 12 MONTHS, HAS LACK OF TRANSPORTATION KEPT YOU FROM MEETINGS, WORK, OR FROM GETTING THINGS NEEDED FOR DAILY LIVING?: NO

## 2023-08-14 SDOH — ECONOMIC STABILITY: HOUSING INSECURITY
IN THE LAST 12 MONTHS, WAS THERE A TIME WHEN YOU DID NOT HAVE A STEADY PLACE TO SLEEP OR SLEPT IN A SHELTER (INCLUDING NOW)?: NO

## 2023-08-14 SDOH — ECONOMIC STABILITY: TRANSPORTATION INSECURITY
IN THE PAST 12 MONTHS, HAS THE LACK OF TRANSPORTATION KEPT YOU FROM MEDICAL APPOINTMENTS OR FROM GETTING MEDICATIONS?: NO

## 2023-08-14 SDOH — HEALTH STABILITY: PHYSICAL HEALTH: ON AVERAGE, HOW MANY DAYS PER WEEK DO YOU ENGAGE IN MODERATE TO STRENUOUS EXERCISE (LIKE A BRISK WALK)?: 7 DAYS

## 2023-08-14 SDOH — ECONOMIC STABILITY: FOOD INSECURITY: WITHIN THE PAST 12 MONTHS, YOU WORRIED THAT YOUR FOOD WOULD RUN OUT BEFORE YOU GOT MONEY TO BUY MORE.: NEVER TRUE

## 2023-08-14 SDOH — ECONOMIC STABILITY: INCOME INSECURITY: IN THE LAST 12 MONTHS, WAS THERE A TIME WHEN YOU WERE NOT ABLE TO PAY THE MORTGAGE OR RENT ON TIME?: NO

## 2023-08-14 SDOH — ECONOMIC STABILITY: FOOD INSECURITY: WITHIN THE PAST 12 MONTHS, THE FOOD YOU BOUGHT JUST DIDN'T LAST AND YOU DIDN'T HAVE MONEY TO GET MORE.: NEVER TRUE

## 2023-08-14 SDOH — HEALTH STABILITY: PHYSICAL HEALTH: ON AVERAGE, HOW MANY MINUTES DO YOU ENGAGE IN EXERCISE AT THIS LEVEL?: 30 MIN

## 2023-08-14 ASSESSMENT — SOCIAL DETERMINANTS OF HEALTH (SDOH)
WITHIN THE LAST YEAR, HAVE TO BEEN RAPED OR FORCED TO HAVE ANY KIND OF SEXUAL ACTIVITY BY YOUR PARTNER OR EX-PARTNER?: NO
HOW OFTEN DO YOU ATTEND CHURCH OR RELIGIOUS SERVICES?: MORE THAN 4 TIMES PER YEAR
WITHIN THE LAST YEAR, HAVE YOU BEEN KICKED, HIT, SLAPPED, OR OTHERWISE PHYSICALLY HURT BY YOUR PARTNER OR EX-PARTNER?: NO
HOW HARD IS IT FOR YOU TO PAY FOR THE VERY BASICS LIKE FOOD, HOUSING, MEDICAL CARE, AND HEATING?: NOT HARD AT ALL
HOW OFTEN DO YOU GET TOGETHER WITH FRIENDS OR RELATIVES?: MORE THAN THREE TIMES A WEEK
WITHIN THE LAST YEAR, HAVE YOU BEEN HUMILIATED OR EMOTIONALLY ABUSED IN OTHER WAYS BY YOUR PARTNER OR EX-PARTNER?: NO
HOW OFTEN DO YOU ATTENT MEETINGS OF THE CLUB OR ORGANIZATION YOU BELONG TO?: NEVER
IN A TYPICAL WEEK, HOW MANY TIMES DO YOU TALK ON THE PHONE WITH FAMILY, FRIENDS, OR NEIGHBORS?: MORE THAN THREE TIMES A WEEK
DO YOU BELONG TO ANY CLUBS OR ORGANIZATIONS SUCH AS CHURCH GROUPS UNIONS, FRATERNAL OR ATHLETIC GROUPS, OR SCHOOL GROUPS?: NO
WITHIN THE LAST YEAR, HAVE YOU BEEN AFRAID OF YOUR PARTNER OR EX-PARTNER?: NO

## 2023-08-14 ASSESSMENT — LIFESTYLE VARIABLES
HOW MANY STANDARD DRINKS CONTAINING ALCOHOL DO YOU HAVE ON A TYPICAL DAY: PATIENT DOES NOT DRINK
HOW OFTEN DO YOU HAVE A DRINK CONTAINING ALCOHOL: NEVER

## 2023-08-14 ASSESSMENT — ANXIETY QUESTIONNAIRES
IF YOU CHECKED OFF ANY PROBLEMS ON THIS QUESTIONNAIRE, HOW DIFFICULT HAVE THESE PROBLEMS MADE IT FOR YOU TO DO YOUR WORK, TAKE CARE OF THINGS AT HOME, OR GET ALONG WITH OTHER PEOPLE: NOT DIFFICULT AT ALL
1. FEELING NERVOUS, ANXIOUS, OR ON EDGE: 0
5. BEING SO RESTLESS THAT IT IS HARD TO SIT STILL: 0
4. TROUBLE RELAXING: 0
GAD7 TOTAL SCORE: 0
3. WORRYING TOO MUCH ABOUT DIFFERENT THINGS: 0
7. FEELING AFRAID AS IF SOMETHING AWFUL MIGHT HAPPEN: 0
2. NOT BEING ABLE TO STOP OR CONTROL WORRYING: 0
6. BECOMING EASILY ANNOYED OR IRRITABLE: 0

## 2023-08-14 ASSESSMENT — PATIENT HEALTH QUESTIONNAIRE - PHQ9
1. LITTLE INTEREST OR PLEASURE IN DOING THINGS: 0
SUM OF ALL RESPONSES TO PHQ9 QUESTIONS 1 & 2: 0
SUM OF ALL RESPONSES TO PHQ QUESTIONS 1-9: 0
2. FEELING DOWN, DEPRESSED OR HOPELESS: 0

## 2023-08-16 LAB
APO B SERPL-MCNC: 105 MG/DL
BASOPHILS # BLD AUTO: 0 X10E3/UL (ref 0–0.2)
BASOPHILS NFR BLD AUTO: 0 %
BUN SERPL-MCNC: 16 MG/DL (ref 6–24)
BUN/CREAT SERPL: 8 (ref 9–20)
CALCIUM SERPL-MCNC: 9.5 MG/DL (ref 8.7–10.2)
CHLORIDE SERPL-SCNC: 100 MMOL/L (ref 96–106)
CO2 SERPL-SCNC: 24 MMOL/L (ref 20–29)
CREAT SERPL-MCNC: 2.02 MG/DL (ref 0.76–1.27)
CREAT UR-MCNC: 587 MG/DL
EGFRCR SERPLBLD CKD-EPI 2021: 38 ML/MIN/1.73
EOSINOPHIL # BLD AUTO: 0.4 X10E3/UL (ref 0–0.4)
EOSINOPHIL NFR BLD AUTO: 4 %
ERYTHROCYTE [DISTWIDTH] IN BLOOD BY AUTOMATED COUNT: 14.8 % (ref 11.6–15.4)
FERRITIN SERPL-MCNC: 46 NG/ML (ref 30–400)
GLUCOSE SERPL-MCNC: 106 MG/DL (ref 70–99)
HCT VFR BLD AUTO: 40.6 % (ref 37.5–51)
HGB BLD-MCNC: 13.1 G/DL (ref 13–17.7)
IMM GRANULOCYTES # BLD AUTO: 0 X10E3/UL (ref 0–0.1)
IMM GRANULOCYTES NFR BLD AUTO: 0 %
LYMPHOCYTES # BLD AUTO: 1.8 X10E3/UL (ref 0.7–3.1)
LYMPHOCYTES NFR BLD AUTO: 19 %
MCH RBC QN AUTO: 27.2 PG (ref 26.6–33)
MCHC RBC AUTO-ENTMCNC: 32.3 G/DL (ref 31.5–35.7)
MCV RBC AUTO: 84 FL (ref 79–97)
MONOCYTES # BLD AUTO: 0.7 X10E3/UL (ref 0.1–0.9)
MONOCYTES NFR BLD AUTO: 8 %
NEUTROPHILS # BLD AUTO: 6.5 X10E3/UL (ref 1.4–7)
NEUTROPHILS NFR BLD AUTO: 69 %
PLATELET # BLD AUTO: 248 X10E3/UL (ref 150–450)
POTASSIUM SERPL-SCNC: 3.3 MMOL/L (ref 3.5–5.2)
PROT UR-MCNC: 40.1 MG/DL
PROT/CREAT UR: 68 MG/G CREAT (ref 0–200)
RBC # BLD AUTO: 4.82 X10E6/UL (ref 4.14–5.8)
SODIUM SERPL-SCNC: 143 MMOL/L (ref 134–144)
WBC # BLD AUTO: 9.4 X10E3/UL (ref 3.4–10.8)

## 2023-08-17 LAB
G6PD BLD QN: 250 U/10E12 RBC (ref 127–427)
HGB A MFR BLD ELPH: 97.4 % (ref 96.4–98.8)
HGB A2 MFR BLD ELPH: 2.6 % (ref 1.8–3.2)
HGB F MFR BLD ELPH: 0 % (ref 0–2)
HGB FRACT BLD-IMP: NORMAL
HGB S MFR BLD ELPH: 0 %

## 2023-08-23 ENCOUNTER — TELEPHONE (OUTPATIENT)
Facility: CLINIC | Age: 58
End: 2023-08-23

## 2023-08-23 NOTE — TELEPHONE ENCOUNTER
----- Message from Melia Novoa MD sent at 8/22/2023 10:30 PM EDT -----  Increase KCl to 20 mEq daily.   Tell patient to increase his intake of liquids----repeat BMP 1 month

## 2023-08-23 NOTE — TELEPHONE ENCOUNTER
PATIENT NOTIFIED AND ASK TO INCREASE POTASSIUM 10MEQ TO 2 TABS DAILY AND RTO 1 MONTH FOR REPEAT BMP AND INCREASE HIS FLUID INTAKE

## 2023-09-13 ENCOUNTER — ANESTHESIA EVENT (OUTPATIENT)
Facility: HOSPITAL | Age: 58
End: 2023-09-13
Payer: MEDICAID

## 2023-09-13 ENCOUNTER — ANESTHESIA (OUTPATIENT)
Facility: HOSPITAL | Age: 58
End: 2023-09-13
Payer: MEDICAID

## 2023-09-13 ENCOUNTER — HOSPITAL ENCOUNTER (OUTPATIENT)
Facility: HOSPITAL | Age: 58
Setting detail: OUTPATIENT SURGERY
Discharge: HOME | End: 2023-09-13
Attending: INTERNAL MEDICINE | Admitting: INTERNAL MEDICINE
Payer: MEDICAID

## 2023-09-13 VITALS
HEART RATE: 64 BPM | DIASTOLIC BLOOD PRESSURE: 106 MMHG | BODY MASS INDEX: 34.8 KG/M2 | WEIGHT: 235 LBS | RESPIRATION RATE: 14 BRPM | TEMPERATURE: 99.1 F | OXYGEN SATURATION: 96 % | SYSTOLIC BLOOD PRESSURE: 152 MMHG | HEIGHT: 69 IN

## 2023-09-13 PROCEDURE — 3600007501: Performed by: INTERNAL MEDICINE

## 2023-09-13 PROCEDURE — 3700000000 HC ANESTHESIA ATTENDED CARE: Performed by: INTERNAL MEDICINE

## 2023-09-13 PROCEDURE — 2500000003 HC RX 250 WO HCPCS: Performed by: NURSE ANESTHETIST, CERTIFIED REGISTERED

## 2023-09-13 PROCEDURE — 7100000010 HC PHASE II RECOVERY - FIRST 15 MIN: Performed by: INTERNAL MEDICINE

## 2023-09-13 PROCEDURE — 2580000003 HC RX 258: Performed by: INTERNAL MEDICINE

## 2023-09-13 PROCEDURE — 7100000011 HC PHASE II RECOVERY - ADDTL 15 MIN: Performed by: INTERNAL MEDICINE

## 2023-09-13 PROCEDURE — 6360000002 HC RX W HCPCS: Performed by: NURSE ANESTHETIST, CERTIFIED REGISTERED

## 2023-09-13 RX ORDER — SODIUM CHLORIDE 0.9 % (FLUSH) 0.9 %
5-40 SYRINGE (ML) INJECTION PRN
Status: DISCONTINUED | OUTPATIENT
Start: 2023-09-13 | End: 2023-09-13 | Stop reason: HOSPADM

## 2023-09-13 RX ORDER — SODIUM CHLORIDE 9 MG/ML
25 INJECTION, SOLUTION INTRAVENOUS PRN
Status: DISCONTINUED | OUTPATIENT
Start: 2023-09-13 | End: 2023-09-13 | Stop reason: HOSPADM

## 2023-09-13 RX ORDER — SODIUM CHLORIDE 9 MG/ML
INJECTION, SOLUTION INTRAVENOUS CONTINUOUS
Status: DISCONTINUED | OUTPATIENT
Start: 2023-09-13 | End: 2023-09-13 | Stop reason: HOSPADM

## 2023-09-13 RX ORDER — SODIUM CHLORIDE 0.9 % (FLUSH) 0.9 %
5-40 SYRINGE (ML) INJECTION EVERY 12 HOURS SCHEDULED
Status: DISCONTINUED | OUTPATIENT
Start: 2023-09-13 | End: 2023-09-13 | Stop reason: HOSPADM

## 2023-09-13 RX ADMIN — PROPOFOL 20 MG: 10 INJECTION, EMULSION INTRAVENOUS at 16:31

## 2023-09-13 RX ADMIN — LIDOCAINE HYDROCHLORIDE 60 MG: 20 INJECTION, SOLUTION EPIDURAL; INFILTRATION; INTRACAUDAL; PERINEURAL at 16:27

## 2023-09-13 RX ADMIN — PROPOFOL 30 MG: 10 INJECTION, EMULSION INTRAVENOUS at 16:28

## 2023-09-13 RX ADMIN — SODIUM CHLORIDE: 9 INJECTION, SOLUTION INTRAVENOUS at 16:00

## 2023-09-13 RX ADMIN — PROPOFOL 20 MG: 10 INJECTION, EMULSION INTRAVENOUS at 16:29

## 2023-09-13 RX ADMIN — PROPOFOL 70 MG: 10 INJECTION, EMULSION INTRAVENOUS at 16:27

## 2023-09-13 NOTE — PROGRESS NOTES
Endoscopy recovery  Patient returned to baseline, vital signs stable (see vital sign flowsheet). Bp elevated but patient states has not had bp meds today and maybe forgot to take them yesterday as well. Anesthesia aware and ordered patient to take meds as soon as he gets home. Reiterated this  and patient verbalized understanding. Patient offered liquids and tolerated well. Respiratory status within defined limits. Abdomen soft not tender. Skin with in defined limits. Responsible party driving patient home was given the opportunity to ask questions. Patient discharged with documented belongings.

## 2023-09-13 NOTE — H&P
[address] [place] [address] [facility]      History and Physical       NAME:  Hayley Spence   :   1965   MRN:   [id number]             History of Present Illness:  Patient is a 56 y.o. who is seen for screening colonoscopy . PMH:  Past Medical History:   Diagnosis Date    Advanced care planning/counseling discussion 16    Chronic pain     back    Dyslipidemia     high cholesterol    Hypertension     Unspecified adverse effect of anesthesia     sleep apnea/not diagnosed/snores    Vitamin D deficiency 10/17/2014       PSH:  Past Surgical History:   Procedure Laterality Date    COLONOSCOPY      early 46s    ORTHOPEDIC SURGERY  2011    stimulator Lilian Villalba    umbilical hernia       Allergies: Allergies   Allergen Reactions    Pineapple Swelling    Amoxicillin Rash    Amoxicillin-Pot Clavulanate Rash       Home Medications:  Prior to Admission Medications   Prescriptions Last Dose Informant Patient Reported? Taking?    amLODIPine (NORVASC) 10 MG tablet 2023  Yes No   Sig: TAKE 1 TABLET BY MOUTH ONCE DAILY *NEED TO ESTABLISH WITH NEW PRACTICE DUE TO [facility] CLOSING*   atenolol-chlorthalidone (TENORETIC) 50-25 MG per tablet 2023  Yes No   Sig: TAKE 1 TABLET BY MOUTH ONCE DAILY *NEED TO ESTABLISH WITH A NEW PRACTICE FOR FUTURE REFILLS*   eplerenone (INSPRA) 50 MG tablet 2023  Yes No   Sig: TAKE 1 TABLET BY MOUTH ONCE DAILY *NEED TO ESTABLISH WITH A NEW PRACTICE FOR FUTURE REFILLS*   indomethacin (INDOCIN) 50 MG capsule Unknown  Yes No   Sig: TAKE 1 TABLET BY MOUTH THREE TIMES DAILY WITH FOOD OR MILK AS NEEDED FOR PAIN   olmesartan (BENICAR) 20 MG tablet 2023  No No   Sig: Take 1 tablet by mouth once daily   potassium chloride (KLOR-CON) 10 MEQ extended release tablet 2023  Yes No   Sig: Take by mouth daily   rosuvastatin (CRESTOR) 20 MG tablet 2023  Yes No   Sig: Take by

## 2023-09-13 NOTE — PROGRESS NOTES

## 2023-09-13 NOTE — DISCHARGE INSTRUCTIONS
[address] [facility], [address] [facility]    COLON DISCHARGE INSTRUCTIONS    Camille Dunbar  [id number]  1965    Discomfort:  Redness at IV site- apply warm compress to area; if redness or soreness persist- contact your physician  There may be a slight amount of blood passed from the rectum  Gaseous discomfort- walking, belching will help relieve any discomfort  You may not operate a vehicle for 12 hours  You may not engage in an occupation involving machinery or appliances for rest of today  You may not drink alcoholic beverages for at least 12 hours  Avoid making any critical decisions for at least 24 hour  DIET:  You may resume your regular diet - however -  remember your colon is empty and a heavy meal will produce gas. Avoid these foods:  vegetables, fried / greasy foods, carbonated drinks     ACTIVITY:  You may  resume your normal daily activities it is recommended that you spend the remainder of the day resting -  avoid any strenuous activity. CALL M.D.   ANY SIGN OF:   Increasing pain, nausea, vomiting  Abdominal distension (swelling)  New increased bleeding (oral or rectal)  Fever (chills)  Pain in chest area  Bloody discharge from nose or mouth  Shortness of breath      Follow-up Instructions:   Call Dr. Jillian Carrington for any questions or problems at [phone]   High fiber diet    Colonoscopy in 10 years           ENDOSCOPY FINDINGS:   Your colonoscopy showed only mild diverticulosis, rest of exam was normal.  Telephone # [phone]      Signed By: Jillian Carrington MD     9/13/2023  4:42 PM       DISCHARGE SUMMARY from Nurse    The following personal items collected during your admission are returned to you:   Dental Appliance:    Vision:    Hearing Aid:    Jewelry:    Clothing:    Other Valuables:    Valuables sent to safe: Dose (mL/hr) Propofol : *20 mg      Learning About Coronavirus (COVID-19)  Coronavirus (COVID-19): Overview  What is coronavirus

## 2023-09-13 NOTE — OP NOTE
[facility]  [address] [place] [address], [address] [facility]      Colonoscopy Operative Report    Diann Kelly  [id number]  1965      Procedure Type:   Colonoscopy --diagnostic     Indications:    Screening colonoscopy       Pre-operative Diagnosis: see indication above    Post-operative Diagnosis:  See findings below    :  Erasto Lovell MD    Surgical Assistant: Circulator: Scarlett Delcid RN  Endoscopy Technician: Jovon Samuels    Implants:  None    Referring Provider: Clyde Preciado MD      Sedation:  MAC anesthesia Propofol      Procedure Details:  After informed consent was obtained with all risks and benefits of procedure explained and preoperative exam completed, the patient was taken to the endoscopy suite and placed in the left lateral decubitus position. Upon sequential sedation as per above, a digital rectal exam was performed demonstrating internal hemorrhoids. The Olympus videocolonoscope  was inserted in the rectum and carefully advanced to the cecum, which was identified by the ileocecal valve and appendiceal orifice. The cecum was identified by the ileocecal valve and appendiceal orifice. The quality of preparation was good. The colonoscope was slowly withdrawn with careful evaluation between folds. Retroflexion in the rectum was completed . Findings:   Rectum: normal  Sigmoid: mild diverticulosis; Descending Colon: mild diverticulosis;  Transverse Colon: normal  Ascending Colon: normal  Cecum: normal    Specimen Removed:  none    Complications: None. EBL:  None. Impression:     see findings     Recommendations: --For colon cancer screening in this average-risk patient, colonoscopy may be repeated in 10 years.     High fiber diet     Signed By: Erasto Lovell MD     9/13/2023  4:40 PM

## 2023-09-14 NOTE — ANESTHESIA POSTPROCEDURE EVALUATION
Department of Anesthesiology  Postprocedure Note    Patient: Graylon Leaks  MRN: [id number]  YOB: 1965  Date of evaluation: 9/14/2023      Procedure Summary     Date: 09/13/23 Room / Location: [facility] ENDO 03 / [facility] ENDOSCOPY    Anesthesia Start: 65 Anesthesia Stop: 1637    Procedure: COLONOSCOPY Diagnosis:       Screen for colon cancer      (Screen for colon cancer [Z12.11])    Surgeons: Kenisha Mcnair MD Responsible Provider: Jake Solorio MD    Anesthesia Type: MAC ASA Status: 2          Anesthesia Type: MAC    Dominic Phase I: Dominic Score: 10    Dominic Phase II: Dominic Score: 10      Anesthesia Post Evaluation    Patient location during evaluation: PACU  Patient participation: complete - patient participated  Level of consciousness: awake  Pain score: 0  Airway patency: patent  Nausea & Vomiting: no nausea  Complications: no  Cardiovascular status: blood pressure returned to baseline and hemodynamically stable  Respiratory status: acceptable  Hydration status: stable  Pain management: adequate and satisfactory to patient

## 2023-09-18 ENCOUNTER — NURSE ONLY (OUTPATIENT)
Facility: CLINIC | Age: 58
End: 2023-09-18

## 2023-09-18 DIAGNOSIS — I10 ESSENTIAL HYPERTENSION: Primary | ICD-10-CM

## 2023-09-19 LAB
ANION GAP SERPL CALC-SCNC: 2 MMOL/L (ref 5–15)
BUN SERPL-MCNC: 19 MG/DL (ref 6–20)
BUN/CREAT SERPL: 11 (ref 12–20)
CALCIUM SERPL-MCNC: 9.2 MG/DL (ref 8.5–10.1)
CHLORIDE SERPL-SCNC: 106 MMOL/L (ref 97–108)
CO2 SERPL-SCNC: 33 MMOL/L (ref 21–32)
CREAT SERPL-MCNC: 1.68 MG/DL (ref 0.7–1.3)
GLUCOSE SERPL-MCNC: 87 MG/DL (ref 65–100)
POTASSIUM SERPL-SCNC: 3.3 MMOL/L (ref 3.5–5.1)
SODIUM SERPL-SCNC: 141 MMOL/L (ref 136–145)

## 2023-09-24 DIAGNOSIS — E87.6 HYPOKALEMIA: Primary | ICD-10-CM

## 2023-09-24 RX ORDER — POTASSIUM CHLORIDE 750 MG/1
20 TABLET, FILM COATED, EXTENDED RELEASE ORAL 2 TIMES DAILY
Qty: 120 TABLET | Refills: 1 | Status: SHIPPED | OUTPATIENT
Start: 2023-09-24

## 2023-09-26 ENCOUNTER — TELEPHONE (OUTPATIENT)
Facility: CLINIC | Age: 58
End: 2023-09-26

## 2023-09-26 NOTE — TELEPHONE ENCOUNTER
----- Message from Sandy Pa MD sent at 9/24/2023  5:41 PM EDT -----  Callus KCl must be increased to 2 tablets twice a day as 20 mEq twice a day------ return to office in 2 weeks for a BMP

## 2023-11-23 DIAGNOSIS — E87.6 HYPOKALEMIA: ICD-10-CM

## 2023-11-30 DIAGNOSIS — E87.6 HYPOKALEMIA: ICD-10-CM

## 2023-12-01 RX ORDER — POTASSIUM CHLORIDE 750 MG/1
20 TABLET, FILM COATED, EXTENDED RELEASE ORAL 2 TIMES DAILY
Qty: 120 TABLET | Refills: 1 | Status: SHIPPED | OUTPATIENT
Start: 2023-12-01

## 2023-12-14 ENCOUNTER — OFFICE VISIT (OUTPATIENT)
Facility: CLINIC | Age: 58
End: 2023-12-14
Payer: COMMERCIAL

## 2023-12-14 VITALS
SYSTOLIC BLOOD PRESSURE: 120 MMHG | WEIGHT: 255.8 LBS | BODY MASS INDEX: 36.62 KG/M2 | DIASTOLIC BLOOD PRESSURE: 72 MMHG | RESPIRATION RATE: 18 BRPM | TEMPERATURE: 97.9 F | HEART RATE: 69 BPM | OXYGEN SATURATION: 98 % | HEIGHT: 70 IN

## 2023-12-14 DIAGNOSIS — E66.9 OBESITY (BMI 30.0-34.9): ICD-10-CM

## 2023-12-14 DIAGNOSIS — E78.5 DYSLIPIDEMIA: ICD-10-CM

## 2023-12-14 DIAGNOSIS — M10.9 GOUT, UNSPECIFIED CAUSE, UNSPECIFIED CHRONICITY, UNSPECIFIED SITE: ICD-10-CM

## 2023-12-14 DIAGNOSIS — M76.899 TENDINITIS OF KNEE: Primary | ICD-10-CM

## 2023-12-14 DIAGNOSIS — I10 ESSENTIAL HYPERTENSION: ICD-10-CM

## 2023-12-14 PROCEDURE — 3074F SYST BP LT 130 MM HG: CPT | Performed by: INTERNAL MEDICINE

## 2023-12-14 PROCEDURE — 3078F DIAST BP <80 MM HG: CPT | Performed by: INTERNAL MEDICINE

## 2023-12-14 PROCEDURE — 99214 OFFICE O/P EST MOD 30 MIN: CPT | Performed by: INTERNAL MEDICINE

## 2023-12-14 SDOH — ECONOMIC STABILITY: INCOME INSECURITY: HOW HARD IS IT FOR YOU TO PAY FOR THE VERY BASICS LIKE FOOD, HOUSING, MEDICAL CARE, AND HEATING?: NOT HARD AT ALL

## 2023-12-14 SDOH — ECONOMIC STABILITY: FOOD INSECURITY: WITHIN THE PAST 12 MONTHS, THE FOOD YOU BOUGHT JUST DIDN'T LAST AND YOU DIDN'T HAVE MONEY TO GET MORE.: NEVER TRUE

## 2023-12-14 SDOH — ECONOMIC STABILITY: FOOD INSECURITY: WITHIN THE PAST 12 MONTHS, YOU WORRIED THAT YOUR FOOD WOULD RUN OUT BEFORE YOU GOT MONEY TO BUY MORE.: NEVER TRUE

## 2023-12-14 ASSESSMENT — PATIENT HEALTH QUESTIONNAIRE - PHQ9
2. FEELING DOWN, DEPRESSED OR HOPELESS: 0
SUM OF ALL RESPONSES TO PHQ QUESTIONS 1-9: 0
1. LITTLE INTEREST OR PLEASURE IN DOING THINGS: 0
SUM OF ALL RESPONSES TO PHQ9 QUESTIONS 1 & 2: 0
SUM OF ALL RESPONSES TO PHQ QUESTIONS 1-9: 0

## 2023-12-14 ASSESSMENT — ANXIETY QUESTIONNAIRES
5. BEING SO RESTLESS THAT IT IS HARD TO SIT STILL: 0
IF YOU CHECKED OFF ANY PROBLEMS ON THIS QUESTIONNAIRE, HOW DIFFICULT HAVE THESE PROBLEMS MADE IT FOR YOU TO DO YOUR WORK, TAKE CARE OF THINGS AT HOME, OR GET ALONG WITH OTHER PEOPLE: NOT DIFFICULT AT ALL
1. FEELING NERVOUS, ANXIOUS, OR ON EDGE: 0
7. FEELING AFRAID AS IF SOMETHING AWFUL MIGHT HAPPEN: 0
6. BECOMING EASILY ANNOYED OR IRRITABLE: 0
4. TROUBLE RELAXING: 0
3. WORRYING TOO MUCH ABOUT DIFFERENT THINGS: 0
2. NOT BEING ABLE TO STOP OR CONTROL WORRYING: 0
GAD7 TOTAL SCORE: 0

## 2023-12-14 NOTE — PROGRESS NOTES
Chief Complaint   Patient presents with    Follow-up    Hypertension     1. Have you been to the ER, urgent care clinic since your last visit? Hospitalized since your last visit? No    2. Have you seen or consulted any other health care providers outside of the [address] [place] [address] since your last visit? Include any pap smears or colon screening.  No

## 2023-12-15 NOTE — PROGRESS NOTES
comes in for return visit stating that he has done well. His chief complaint is that of pain in his left knee particularly when he is putting pressure on it. He has had no meaningful weight loss. He has a past history of primary hypertension, as well as dyslipidemia and gout.

## 2023-12-15 NOTE — PROGRESS NOTES
[facility]  [address] [place] [address]  Phone:  [phone]  Fax: [phone]       Chief Complaint   Patient presents with    Follow-up    Hypertension   . SUBJECTIVE:    Amanda Hernandez is a 66 y.o. male  Dictation on: 12/14/2023  9:43 PM by: Adelaida Dey [[id number]]          Current Outpatient Medications   Medication Sig Dispense Refill    potassium chloride (KLOR-CON) 10 MEQ extended release tablet Take 2 tablets by mouth 2 times daily 120 tablet 1    olmesartan (BENICAR) 20 MG tablet Take 1 tablet by mouth once daily 30 tablet 11    amLODIPine (NORVASC) 10 MG tablet TAKE 1 TABLET BY MOUTH ONCE DAILY *NEED TO ESTABLISH WITH NEW PRACTICE DUE TO [facility] CLOSING*      atenolol-chlorthalidone (TENORETIC) 50-25 MG per tablet TAKE 1 TABLET BY MOUTH ONCE DAILY *NEED TO ESTABLISH WITH A NEW PRACTICE FOR FUTURE REFILLS*      eplerenone (INSPRA) 50 MG tablet TAKE 1 TABLET BY MOUTH ONCE DAILY *NEED TO ESTABLISH WITH A NEW PRACTICE FOR FUTURE REFILLS*      indomethacin (INDOCIN) 50 MG capsule TAKE 1 TABLET BY MOUTH THREE TIMES DAILY WITH FOOD OR MILK AS NEEDED FOR PAIN      rosuvastatin (CRESTOR) 20 MG tablet Take by mouth       No current facility-administered medications for this visit.      Past Medical History:   Diagnosis Date    Advanced care planning/counseling discussion 8/9/16    Chronic pain     back    Dyslipidemia     high cholesterol    Hypertension     Unspecified adverse effect of anesthesia     sleep apnea/not diagnosed/snores    Vitamin D deficiency 10/17/2014     Past Surgical History:   Procedure Laterality Date    COLONOSCOPY      early 46s    COLONOSCOPY N/A 9/13/2023    COLONOSCOPY performed by Irma Marcus MD at [address] [place] [address] Rd  09/2011    stimulator Monae Vasquez    umbilical hernia     Allergies   Allergen Reactions    Pineapple Swelling    Amoxicillin Rash

## 2023-12-15 NOTE — PROGRESS NOTES
1.  The patient has as tendinitis of the patella tendon and/or bursitis of the tibial tubercle. He is not a candidate for NSAID in view of mild renal dysfunction that exists. An injection would help, but at this point he is not really interested in that. 2. Blood pressure is excellent, no adjustments are made. 3. I again encouraged weight reduction. This can be accomplished by eating meals, eliminating snacks, and avoiding the consumption of processed carbohydrates. 4. He remains on her statin and efficacy will be assessed. 5. His gout is quite stable. I explained to him that he should not use indomethacin in view of his mild renal dysfunction. 6. I suspect his renal dysfunction is probably secondary to mild element of hypertensive nephrosclerosis.

## 2023-12-16 LAB
APO B SERPL-MCNC: 106 MG/DL
BUN SERPL-MCNC: 24 MG/DL (ref 6–24)
BUN/CREAT SERPL: 16 (ref 9–20)
CALCIUM SERPL-MCNC: 9.4 MG/DL (ref 8.7–10.2)
CHLORIDE SERPL-SCNC: 103 MMOL/L (ref 96–106)
CO2 SERPL-SCNC: 26 MMOL/L (ref 20–29)
CREAT SERPL-MCNC: 1.46 MG/DL (ref 0.76–1.27)
EGFRCR SERPLBLD CKD-EPI 2021: 56 ML/MIN/1.73
GLUCOSE SERPL-MCNC: 86 MG/DL (ref 70–99)
POTASSIUM SERPL-SCNC: 3.6 MMOL/L (ref 3.5–5.2)
SODIUM SERPL-SCNC: 142 MMOL/L (ref 134–144)

## 2024-01-24 RX ORDER — POTASSIUM CHLORIDE 750 MG/1
20 TABLET, FILM COATED, EXTENDED RELEASE ORAL 2 TIMES DAILY
Qty: 120 TABLET | Refills: 0 | OUTPATIENT
Start: 2024-01-24

## 2024-01-27 DIAGNOSIS — E87.6 HYPOKALEMIA: ICD-10-CM

## 2024-01-30 RX ORDER — POTASSIUM CHLORIDE 750 MG/1
20 TABLET, FILM COATED, EXTENDED RELEASE ORAL 2 TIMES DAILY
Qty: 120 TABLET | Refills: 11 | Status: SHIPPED | OUTPATIENT
Start: 2024-01-30

## 2024-02-29 RX ORDER — ROSUVASTATIN CALCIUM 20 MG/1
20 TABLET, COATED ORAL NIGHTLY
Qty: 30 TABLET | Refills: 0 | Status: SHIPPED | OUTPATIENT
Start: 2024-02-29

## 2024-02-29 RX ORDER — ATENOLOL AND CHLORTHALIDONE TABLET 50; 25 MG/1; MG/1
TABLET ORAL
Qty: 30 TABLET | Refills: 0 | Status: SHIPPED | OUTPATIENT
Start: 2024-02-29

## 2024-02-29 RX ORDER — EPLERENONE 50 MG/1
50 TABLET, FILM COATED ORAL DAILY
Qty: 30 TABLET | Refills: 0 | Status: SHIPPED | OUTPATIENT
Start: 2024-02-29

## 2024-03-20 RX ORDER — AMLODIPINE BESYLATE 10 MG/1
10 TABLET ORAL DAILY
Qty: 30 TABLET | Refills: 11 | Status: SHIPPED | OUTPATIENT
Start: 2024-03-20

## 2024-04-08 ENCOUNTER — OFFICE VISIT (OUTPATIENT)
Facility: CLINIC | Age: 59
End: 2024-04-08
Payer: COMMERCIAL

## 2024-04-08 VITALS
SYSTOLIC BLOOD PRESSURE: 151 MMHG | OXYGEN SATURATION: 94 % | TEMPERATURE: 98.5 F | RESPIRATION RATE: 16 BRPM | HEART RATE: 78 BPM | DIASTOLIC BLOOD PRESSURE: 105 MMHG | BODY MASS INDEX: 34.66 KG/M2 | HEIGHT: 70 IN | WEIGHT: 242.1 LBS

## 2024-04-08 DIAGNOSIS — E78.5 DYSLIPIDEMIA: ICD-10-CM

## 2024-04-08 DIAGNOSIS — I12.9 HYPERTENSIVE NEPHROSCLEROSIS, STAGE 1 THROUGH STAGE 4 OR UNSPECIFIED CHRONIC KIDNEY DISEASE: ICD-10-CM

## 2024-04-08 DIAGNOSIS — M10.9 GOUT, UNSPECIFIED CAUSE, UNSPECIFIED CHRONICITY, UNSPECIFIED SITE: Primary | ICD-10-CM

## 2024-04-08 DIAGNOSIS — E66.9 OBESITY (BMI 30.0-34.9): ICD-10-CM

## 2024-04-08 PROCEDURE — 3077F SYST BP >= 140 MM HG: CPT | Performed by: INTERNAL MEDICINE

## 2024-04-08 PROCEDURE — 3080F DIAST BP >= 90 MM HG: CPT | Performed by: INTERNAL MEDICINE

## 2024-04-08 PROCEDURE — 99214 OFFICE O/P EST MOD 30 MIN: CPT | Performed by: INTERNAL MEDICINE

## 2024-04-08 RX ORDER — ROSUVASTATIN CALCIUM 20 MG/1
20 TABLET, COATED ORAL NIGHTLY
Qty: 30 TABLET | Refills: 11 | Status: SHIPPED | OUTPATIENT
Start: 2024-04-08

## 2024-04-08 RX ORDER — PREDNISONE 20 MG/1
20 TABLET ORAL 3 TIMES DAILY
Qty: 30 TABLET | Refills: 3 | Status: SHIPPED | OUTPATIENT
Start: 2024-04-08 | End: 2024-05-18

## 2024-04-08 RX ORDER — EPLERENONE 50 MG/1
50 TABLET, FILM COATED ORAL DAILY
Qty: 30 TABLET | Refills: 11 | Status: SHIPPED | OUTPATIENT
Start: 2024-04-08

## 2024-04-08 RX ORDER — ATENOLOL AND CHLORTHALIDONE TABLET 50; 25 MG/1; MG/1
TABLET ORAL
Qty: 30 TABLET | Refills: 11 | Status: SHIPPED | OUTPATIENT
Start: 2024-04-08

## 2024-04-08 SDOH — ECONOMIC STABILITY: FOOD INSECURITY: WITHIN THE PAST 12 MONTHS, THE FOOD YOU BOUGHT JUST DIDN'T LAST AND YOU DIDN'T HAVE MONEY TO GET MORE.: NEVER TRUE

## 2024-04-08 SDOH — ECONOMIC STABILITY: INCOME INSECURITY: HOW HARD IS IT FOR YOU TO PAY FOR THE VERY BASICS LIKE FOOD, HOUSING, MEDICAL CARE, AND HEATING?: SOMEWHAT HARD

## 2024-04-08 SDOH — ECONOMIC STABILITY: FOOD INSECURITY: WITHIN THE PAST 12 MONTHS, YOU WORRIED THAT YOUR FOOD WOULD RUN OUT BEFORE YOU GOT MONEY TO BUY MORE.: NEVER TRUE

## 2024-04-08 ASSESSMENT — PATIENT HEALTH QUESTIONNAIRE - PHQ9
SUM OF ALL RESPONSES TO PHQ QUESTIONS 1-9: 0
1. LITTLE INTEREST OR PLEASURE IN DOING THINGS: NOT AT ALL
2. FEELING DOWN, DEPRESSED OR HOPELESS: NOT AT ALL
SUM OF ALL RESPONSES TO PHQ QUESTIONS 1-9: 0
SUM OF ALL RESPONSES TO PHQ9 QUESTIONS 1 & 2: 0
SUM OF ALL RESPONSES TO PHQ QUESTIONS 1-9: 0
SUM OF ALL RESPONSES TO PHQ QUESTIONS 1-9: 0

## 2024-04-08 ASSESSMENT — ANXIETY QUESTIONNAIRES
GAD7 TOTAL SCORE: 0
1. FEELING NERVOUS, ANXIOUS, OR ON EDGE: NOT AT ALL
3. WORRYING TOO MUCH ABOUT DIFFERENT THINGS: NOT AT ALL
IF YOU CHECKED OFF ANY PROBLEMS ON THIS QUESTIONNAIRE, HOW DIFFICULT HAVE THESE PROBLEMS MADE IT FOR YOU TO DO YOUR WORK, TAKE CARE OF THINGS AT HOME, OR GET ALONG WITH OTHER PEOPLE: NOT DIFFICULT AT ALL
7. FEELING AFRAID AS IF SOMETHING AWFUL MIGHT HAPPEN: NOT AT ALL
6. BECOMING EASILY ANNOYED OR IRRITABLE: NOT AT ALL
2. NOT BEING ABLE TO STOP OR CONTROL WORRYING: NOT AT ALL
5. BEING SO RESTLESS THAT IT IS HARD TO SIT STILL: NOT AT ALL
4. TROUBLE RELAXING: NOT AT ALL

## 2024-04-08 NOTE — PROGRESS NOTES
Chief Complaint   Patient presents with    Follow-up    Hypertension    Foot Pain     Left foot pain     \"Have you been to the ER, urgent care clinic since your last visit?  Hospitalized since your last visit?\"    NO    “Have you seen or consulted any other health care providers outside of [facility] since your last visit?”    NO            Click Here for Release of Records Request

## 2024-04-09 LAB
BUN SERPL-MCNC: 11 MG/DL (ref 6–24)
BUN/CREAT SERPL: 8 (ref 9–20)
CALCIUM SERPL-MCNC: 9.5 MG/DL (ref 8.7–10.2)
CHLORIDE SERPL-SCNC: 100 MMOL/L (ref 96–106)
CO2 SERPL-SCNC: 23 MMOL/L (ref 20–29)
CREAT SERPL-MCNC: 1.41 MG/DL (ref 0.76–1.27)
EGFRCR SERPLBLD CKD-EPI 2021: 58 ML/MIN/1.73
GLUCOSE SERPL-MCNC: 104 MG/DL (ref 70–99)
POTASSIUM SERPL-SCNC: 3.7 MMOL/L (ref 3.5–5.2)
SODIUM SERPL-SCNC: 140 MMOL/L (ref 134–144)
URATE SERPL-MCNC: 7.8 MG/DL (ref 3.8–8.4)

## 2024-04-17 NOTE — PROGRESS NOTES
[facility]  [address] [place] [address]  [address]  [facility] [address]  Phone:  [phone]  Fax: [phone]       Chief Complaint   Patient presents with    Follow-up    Hypertension    Foot Pain     Left foot pain   .      SUBJECTIVE:    Nick Cavazos is a 58 y.o. male  Dictation on: 04/16/2024 11:41 PM by: ASYA ONOFRE [[id number]]          Current Outpatient Medications   Medication Sig Dispense Refill    rosuvastatin (CRESTOR) 20 MG tablet Take 1 tablet by mouth nightly 30 tablet 11    atenolol-chlorthalidone (TENORETIC) 50-25 MG per tablet TAKE 1 TABLET BY MOUTH ONCE DAILY . APPOINTMENT REQUIRED FOR FUTURE REFILLS 30 tablet 11    eplerenone (INSPRA) 50 MG tablet Take 1 tablet by mouth daily 30 tablet 11    predniSONE (DELTASONE) 20 MG tablet Take 1 tablet by mouth 3 times daily 30 tablet 3    amLODIPine (NORVASC) 10 MG tablet Take 1 tablet by mouth once daily 30 tablet 11    potassium chloride (KLOR-CON) 10 MEQ extended release tablet Take 2 tablets by mouth twice daily 120 tablet 11    olmesartan (BENICAR) 20 MG tablet Take 1 tablet by mouth once daily 30 tablet 11    indomethacin (INDOCIN) 50 MG capsule TAKE 1 TABLET BY MOUTH THREE TIMES DAILY WITH FOOD OR MILK AS NEEDED FOR PAIN (Patient not taking: Reported on 4/8/2024)       No current facility-administered medications for this visit.     Past Medical History:   Diagnosis Date    Advanced care planning/counseling discussion 8/9/16    Chronic pain     back    Dyslipidemia     high cholesterol    Hypertension     Unspecified adverse effect of anesthesia     sleep apnea/not diagnosed/snores    Vitamin D deficiency 10/17/2014     Past Surgical History:   Procedure Laterality Date    COLONOSCOPY      early 50s    COLONOSCOPY N/A 9/13/2023    COLONOSCOPY performed by Betty Gonzalez MD at [facility] ENDOSCOPY    ORTHOPEDIC SURGERY  09/2011    stimulator /trial  Dr. Dillon Hernandez    OTHER SURGICAL HISTORY  1973    umbilical hernia     Allergies

## 2024-04-17 NOTE — PROGRESS NOTES
1. As far as the gout attack is concerned, I will place him on prednisone 20 mg b.i.d. for the next week to ten days.  I will check a uric acid and a BMP.  If these are elevated and he is having more attacks, he will need to be on suppressive therapy that would then allow continued use of his current medications.  2. His blood pressure is a bit elevated.  He states that he has been taking his antihypertensive medication as prescribed.  Apparently, his pressure has been severe enough that he did develop mild renal dysfunction, probably on the basis of hypertensive nephrosclerosis.  3. He will continue statin as prescribed in view of his increased cardiovascular risk.  4. He definitely needs to lose weight.  This can be accomplished by eating meals, eliminating snacks and avoiding the consumption of processed carbohydrates.

## 2024-04-17 NOTE — PROGRESS NOTES
Comes in complaining of pain in his first MTP joint of his left foot. This is quite consistent with his history of gout.  He has been using medication to suppress attacks.  His attacks are rather infrequent, which is the reason he is not on a chronic suppressive agent.    He has a past history of primary hypertension and dyslipidemia.  Obviously, the diuretic is causing his uric acid to go up.    He continues to work on a regular basis.

## 2024-06-11 DIAGNOSIS — E87.6 HYPOKALEMIA: ICD-10-CM

## 2024-06-12 RX ORDER — POTASSIUM CHLORIDE 750 MG/1
20 TABLET, FILM COATED, EXTENDED RELEASE ORAL DAILY
Qty: 60 TABLET | Refills: 11 | Status: SHIPPED | OUTPATIENT
Start: 2024-06-12

## 2024-10-30 RX ORDER — OLMESARTAN MEDOXOMIL 20 MG/1
TABLET ORAL
Qty: 30 TABLET | Refills: 11 | Status: SHIPPED | OUTPATIENT
Start: 2024-10-30

## 2024-11-06 DIAGNOSIS — E87.6 HYPOKALEMIA: ICD-10-CM

## 2024-11-06 DIAGNOSIS — E78.5 DYSLIPIDEMIA: ICD-10-CM

## 2024-11-06 DIAGNOSIS — I12.9 HYPERTENSIVE NEPHROSCLEROSIS, STAGE 1 THROUGH STAGE 4 OR UNSPECIFIED CHRONIC KIDNEY DISEASE: ICD-10-CM

## 2024-11-07 RX ORDER — POTASSIUM CHLORIDE 750 MG/1
20 TABLET, EXTENDED RELEASE ORAL DAILY
Qty: 60 TABLET | Refills: 11 | Status: SHIPPED | OUTPATIENT
Start: 2024-11-07

## 2024-11-07 RX ORDER — INDOMETHACIN 50 MG/1
50 CAPSULE ORAL 3 TIMES DAILY PRN
Qty: 60 CAPSULE | Refills: 0 | Status: SHIPPED | OUTPATIENT
Start: 2024-11-07

## 2024-11-07 RX ORDER — AMLODIPINE BESYLATE 10 MG/1
10 TABLET ORAL DAILY
Qty: 30 TABLET | Refills: 11 | Status: SHIPPED | OUTPATIENT
Start: 2024-11-07

## 2024-11-07 RX ORDER — ROSUVASTATIN CALCIUM 20 MG/1
20 TABLET, COATED ORAL NIGHTLY
Qty: 30 TABLET | Refills: 11 | Status: SHIPPED | OUTPATIENT
Start: 2024-11-07

## 2024-11-07 RX ORDER — OLMESARTAN MEDOXOMIL 20 MG/1
20 TABLET ORAL DAILY
Qty: 30 TABLET | Refills: 11 | Status: SHIPPED | OUTPATIENT
Start: 2024-11-07

## 2024-11-07 RX ORDER — EPLERENONE 50 MG/1
50 TABLET, FILM COATED ORAL DAILY
Qty: 30 TABLET | Refills: 11 | Status: SHIPPED | OUTPATIENT
Start: 2024-11-07

## 2024-11-07 RX ORDER — ATENOLOL AND CHLORTHALIDONE TABLET 50; 25 MG/1; MG/1
TABLET ORAL
Qty: 30 TABLET | Refills: 11 | Status: SHIPPED | OUTPATIENT
Start: 2024-11-07

## 2025-04-14 DIAGNOSIS — E87.6 HYPOKALEMIA: ICD-10-CM

## 2025-04-14 RX ORDER — POTASSIUM CHLORIDE 750 MG/1
20 TABLET, EXTENDED RELEASE ORAL 2 TIMES DAILY
Qty: 120 TABLET | Refills: 0 | OUTPATIENT
Start: 2025-04-14

## 2025-05-21 DIAGNOSIS — E87.6 HYPOKALEMIA: ICD-10-CM

## 2025-05-22 RX ORDER — POTASSIUM CHLORIDE 750 MG/1
20 TABLET, EXTENDED RELEASE ORAL 2 TIMES DAILY
Qty: 120 TABLET | Refills: 0 | Status: SHIPPED | OUTPATIENT
Start: 2025-05-22

## 2025-05-28 ENCOUNTER — OFFICE VISIT (OUTPATIENT)
Facility: CLINIC | Age: 60
End: 2025-05-28
Payer: COMMERCIAL

## 2025-05-28 VITALS
HEART RATE: 67 BPM | DIASTOLIC BLOOD PRESSURE: 80 MMHG | SYSTOLIC BLOOD PRESSURE: 131 MMHG | RESPIRATION RATE: 16 BRPM | TEMPERATURE: 98.3 F | WEIGHT: 232.6 LBS | HEIGHT: 70 IN | OXYGEN SATURATION: 98 % | BODY MASS INDEX: 33.3 KG/M2

## 2025-05-28 DIAGNOSIS — E66.811 OBESITY (BMI 30.0-34.9): ICD-10-CM

## 2025-05-28 DIAGNOSIS — I10 ESSENTIAL HYPERTENSION: Primary | ICD-10-CM

## 2025-05-28 DIAGNOSIS — M10.9 GOUT, UNSPECIFIED CAUSE, UNSPECIFIED CHRONICITY, UNSPECIFIED SITE: ICD-10-CM

## 2025-05-28 DIAGNOSIS — E78.5 DYSLIPIDEMIA: ICD-10-CM

## 2025-05-28 PROCEDURE — 99214 OFFICE O/P EST MOD 30 MIN: CPT | Performed by: INTERNAL MEDICINE

## 2025-05-28 PROCEDURE — 3075F SYST BP GE 130 - 139MM HG: CPT | Performed by: INTERNAL MEDICINE

## 2025-05-28 PROCEDURE — 3079F DIAST BP 80-89 MM HG: CPT | Performed by: INTERNAL MEDICINE

## 2025-05-28 RX ORDER — PREDNISONE 20 MG/1
20 TABLET ORAL 3 TIMES DAILY
Qty: 21 TABLET | Refills: 0 | Status: SHIPPED | OUTPATIENT
Start: 2025-05-28 | End: 2025-06-04

## 2025-05-28 RX ORDER — INDOMETHACIN 50 MG/1
CAPSULE ORAL
Qty: 60 CAPSULE | Refills: 0 | OUTPATIENT
Start: 2025-05-28

## 2025-05-28 SDOH — ECONOMIC STABILITY: FOOD INSECURITY: WITHIN THE PAST 12 MONTHS, YOU WORRIED THAT YOUR FOOD WOULD RUN OUT BEFORE YOU GOT MONEY TO BUY MORE.: NEVER TRUE

## 2025-05-28 SDOH — ECONOMIC STABILITY: FOOD INSECURITY: WITHIN THE PAST 12 MONTHS, THE FOOD YOU BOUGHT JUST DIDN'T LAST AND YOU DIDN'T HAVE MONEY TO GET MORE.: NEVER TRUE

## 2025-05-28 ASSESSMENT — PATIENT HEALTH QUESTIONNAIRE - PHQ9
SUM OF ALL RESPONSES TO PHQ QUESTIONS 1-9: 0
2. FEELING DOWN, DEPRESSED OR HOPELESS: NOT AT ALL
1. LITTLE INTEREST OR PLEASURE IN DOING THINGS: NOT AT ALL
SUM OF ALL RESPONSES TO PHQ QUESTIONS 1-9: 0

## 2025-05-28 NOTE — PROGRESS NOTES
Chief Complaint   Patient presents with    Hypertension    Ankle Pain     Left ankle pain     Patient states he has mario having left ankle pain since yesterday.    Have you been to the ER, urgent care clinic since your last visit?  Hospitalized since your last visit?   NO    Have you seen or consulted any other health care providers outside our system since your last visit?   NO            
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)      Social Drivers of Health     Financial Resource Strain: Medium Risk (2024)    Overall Financial Resource Strain (CARDIA)     Difficulty of Paying Living Expenses: Somewhat hard   Food Insecurity: No Food Insecurity (2025)    Hunger Vital Sign     Worried About Running Out of Food in the Last Year: Never true     Ran Out of Food in the Last Year: Never true   Transportation Needs: No Transportation Needs (2025)    PRAPARE - Transportation     Lack of Transportation (Medical): No     Lack of Transportation (Non-Medical): No   Physical Activity: Sufficiently Active (2023)    Exercise Vital Sign     Days of Exercise per Week: 7 days     Minutes of Exercise per Session: 30 min   Stress: No Stress Concern Present (2023)    [employer] - Occupational Stress Questionnaire     Feeling of Stress : Only a little   Social Connections: Moderately Integrated (2023)    Social Connection and Isolation Panel [NHANES]     Frequency of Communication with Friends and Family: More than three times a week     Frequency of Social Gatherings with Friends and Family: More than three times a week     Attends [employer] Services: More than 4 times per year     Active Member of Clubs or Organizations: No     Attends Club or Organization Meetings: Never     Marital Status:    Intimate Partner Violence: Not At Risk (2023)    Humiliation, Afraid, Rape, and Kick questionnaire     Fear of Current or Ex-Partner: No     Emotionally Abused: No     Physically

## 2025-05-29 LAB
ANION GAP SERPL CALC-SCNC: 7 MMOL/L (ref 2–12)
BUN SERPL-MCNC: 26 MG/DL (ref 6–20)
BUN/CREAT SERPL: 15 (ref 12–20)
CALCIUM SERPL-MCNC: 9.4 MG/DL (ref 8.5–10.1)
CHLORIDE SERPL-SCNC: 104 MMOL/L (ref 97–108)
CO2 SERPL-SCNC: 29 MMOL/L (ref 21–32)
CREAT SERPL-MCNC: 1.7 MG/DL (ref 0.7–1.3)
GLUCOSE SERPL-MCNC: 98 MG/DL (ref 65–100)
POTASSIUM SERPL-SCNC: 3.3 MMOL/L (ref 3.5–5.1)
SODIUM SERPL-SCNC: 140 MMOL/L (ref 136–145)
URATE SERPL-MCNC: 8.1 MG/DL (ref 3.5–7.2)

## 2025-06-19 ENCOUNTER — RESULTS FOLLOW-UP (OUTPATIENT)
Facility: CLINIC | Age: 60
End: 2025-06-19

## 2025-06-19 DIAGNOSIS — E87.6 HYPOKALEMIA: ICD-10-CM

## 2025-06-19 DIAGNOSIS — M10.9 GOUT, UNSPECIFIED CAUSE, UNSPECIFIED CHRONICITY, UNSPECIFIED SITE: Primary | ICD-10-CM

## 2025-06-19 DIAGNOSIS — I10 ESSENTIAL HYPERTENSION: ICD-10-CM

## 2025-06-19 RX ORDER — ALLOPURINOL 300 MG/1
300 TABLET ORAL DAILY
Qty: 30 TABLET | Refills: 11 | Status: SHIPPED | OUTPATIENT
Start: 2025-06-19

## 2025-06-19 RX ORDER — OLMESARTAN MEDOXOMIL 40 MG/1
40 TABLET ORAL DAILY
Qty: 30 TABLET | Refills: 11 | Status: SHIPPED | OUTPATIENT
Start: 2025-06-19

## 2025-06-19 RX ORDER — POTASSIUM CHLORIDE 1500 MG/1
20 TABLET, EXTENDED RELEASE ORAL 2 TIMES DAILY
Qty: 60 TABLET | Refills: 2 | Status: SHIPPED | OUTPATIENT
Start: 2025-06-19

## 2025-06-20 NOTE — TELEPHONE ENCOUNTER
----- Message from Dr. Tripp Prieto MD sent at 6/19/2025 10:30 AM EDT -----  Continues to take allopurinol 300 mg daily  Prednisone 10 mg daily for 1 month  KCL 20 mEq 3 times daily  He also needs to return for renin/aldosterone values.  If his next appointment is within the next 2 to 3 weeks then he can get these lab values when he returns.

## 2025-06-20 NOTE — TELEPHONE ENCOUNTER
Patient notified to continue Allopurinol 300 mg daily   Prednisone 10 mg daily for 1 month  KCL 20 meq 3 times a day. Patient advised to be sure to keep appointment scheduled for July 9th with Dr. Prieto.

## 2025-07-09 ENCOUNTER — OFFICE VISIT (OUTPATIENT)
Facility: CLINIC | Age: 60
End: 2025-07-09
Payer: COMMERCIAL

## 2025-07-09 VITALS
HEART RATE: 69 BPM | TEMPERATURE: 97.5 F | DIASTOLIC BLOOD PRESSURE: 93 MMHG | RESPIRATION RATE: 16 BRPM | OXYGEN SATURATION: 94 % | BODY MASS INDEX: 33.53 KG/M2 | HEIGHT: 70 IN | SYSTOLIC BLOOD PRESSURE: 156 MMHG | WEIGHT: 234.2 LBS

## 2025-07-09 DIAGNOSIS — I10 ESSENTIAL HYPERTENSION: Primary | ICD-10-CM

## 2025-07-09 DIAGNOSIS — E66.811 OBESITY (BMI 30.0-34.9): ICD-10-CM

## 2025-07-09 DIAGNOSIS — M10.9 GOUT, UNSPECIFIED CAUSE, UNSPECIFIED CHRONICITY, UNSPECIFIED SITE: ICD-10-CM

## 2025-07-09 DIAGNOSIS — I12.9 HYPERTENSIVE NEPHROSCLEROSIS, STAGE 1 THROUGH STAGE 4 OR UNSPECIFIED CHRONIC KIDNEY DISEASE: ICD-10-CM

## 2025-07-09 PROCEDURE — 3080F DIAST BP >= 90 MM HG: CPT | Performed by: INTERNAL MEDICINE

## 2025-07-09 PROCEDURE — 99214 OFFICE O/P EST MOD 30 MIN: CPT | Performed by: INTERNAL MEDICINE

## 2025-07-09 PROCEDURE — 3077F SYST BP >= 140 MM HG: CPT | Performed by: INTERNAL MEDICINE

## 2025-07-09 RX ORDER — PREDNISONE 10 MG/1
10 TABLET ORAL DAILY
COMMUNITY
Start: 2025-06-20

## 2025-07-10 RX ORDER — INDOMETHACIN 50 MG/1
50 CAPSULE ORAL 3 TIMES DAILY PRN
Qty: 60 CAPSULE | Refills: 0 | Status: SHIPPED | OUTPATIENT
Start: 2025-07-10

## 2025-07-11 LAB
ANION GAP SERPL CALC-SCNC: 4 MMOL/L (ref 2–12)
BUN SERPL-MCNC: 26 MG/DL (ref 6–20)
BUN/CREAT SERPL: 14 (ref 12–20)
CALCIUM SERPL-MCNC: 9.2 MG/DL (ref 8.5–10.1)
CHLORIDE SERPL-SCNC: 107 MMOL/L (ref 97–108)
CO2 SERPL-SCNC: 31 MMOL/L (ref 21–32)
CREAT SERPL-MCNC: 1.83 MG/DL (ref 0.7–1.3)
GLUCOSE SERPL-MCNC: 60 MG/DL (ref 65–100)
POTASSIUM SERPL-SCNC: 3.6 MMOL/L (ref 3.5–5.1)
SODIUM SERPL-SCNC: 142 MMOL/L (ref 136–145)
URATE SERPL-MCNC: 5.2 MG/DL (ref 3.5–7.2)

## 2025-07-22 ENCOUNTER — RESULTS FOLLOW-UP (OUTPATIENT)
Facility: CLINIC | Age: 60
End: 2025-07-22

## 2025-07-26 NOTE — PROGRESS NOTES
Chief Complaint   Patient presents with    Follow-up     Pt is here for a follow up      Have you been to the ER, urgent care clinic since your last visit?  Hospitalized since your last visit?   NO    Have you seen or consulted any other health care providers outside our system since your last visit?   NO           
Moved in the Last Year: 0     Homeless in the Last Year: No     Family History   Problem Relation Age of Onset    Heart Attack Brother 53    Hypertension Brother         51     Heart Disease Mother 75        MI    Osteoarthritis Mother     Rheum Arthritis Mother     Cancer Father         prostate CA - unknown       OBJECTIVE:    BP (!) 156/93 (BP Site: Right Upper Arm, Patient Position: Sitting)   Pulse 69   Temp 97.5 °F (36.4 °C) (Oral)   Resp 16   Ht 1.778 m (5' 10\")   Wt 106.2 kg (234 lb 3.2 oz)   SpO2 94%   BMI 33.60 kg/m²   CONSTITUTIONAL: well , well nourished, appears age appropriate  EYES: perrla, eom intact  ENMT:moist mucous membranes, pharynx clear  NECK: supple. Thyroid normal  RESPIRATORY: Chest: clear to ascultation and percussion   CARDIOVASCULAR: Heart: regular rate and rhythm  GASTROINTESTINAL: Abdomen: soft, bowel sounds active  HEMATOLOGIC: no pathological lymph nodes palpated  MUSCULOSKELETAL: Extremities: no edema, pulse 1+   INTEGUMENT: No unusual rashes or suspicious skin lesions noted. Nails appear normal.  NEUROLOGIC: non-focal exam   MENTAL STATUS: alert and oriented, appropriate affect      ASSESSMENT:  1. Essential hypertension  -     Basic Metabolic Panel; Future  2. Gout, unspecified cause, unspecified chronicity, unspecified site  -     Uric Acid; Future  3. Obesity (BMI 30.0-34.9)  4. Hypertensive nephrosclerosis, stage 1 through stage 4 or unspecified chronic kidney disease       Assessment & Plan  1. Gout.  - Uric acid level is elevated at 8.1.  - Currently taking prednisone and allopurinol.  - Advised to discontinue eplerenone, Tenoretic, and KCl due to their diuretic effects, which can exacerbate gout.  - Increase fluid intake. Uric acid test and BMP will be conducted to monitor his condition. If recurrent attacks persist, alternative medications will be considered.    2.  Hypertension  - As I stated earlier Tenoretic and eplerenone will be discontinued.  On his return

## 2025-08-11 ENCOUNTER — OFFICE VISIT (OUTPATIENT)
Facility: CLINIC | Age: 60
End: 2025-08-11
Payer: COMMERCIAL

## 2025-08-11 VITALS
RESPIRATION RATE: 16 BRPM | HEART RATE: 79 BPM | TEMPERATURE: 97.7 F | DIASTOLIC BLOOD PRESSURE: 93 MMHG | SYSTOLIC BLOOD PRESSURE: 168 MMHG | WEIGHT: 242.6 LBS | OXYGEN SATURATION: 95 % | BODY MASS INDEX: 34.73 KG/M2 | HEIGHT: 70 IN

## 2025-08-11 DIAGNOSIS — M10.9 GOUT, UNSPECIFIED CAUSE, UNSPECIFIED CHRONICITY, UNSPECIFIED SITE: ICD-10-CM

## 2025-08-11 DIAGNOSIS — E78.5 DYSLIPIDEMIA: ICD-10-CM

## 2025-08-11 DIAGNOSIS — I12.9 HYPERTENSIVE NEPHROSCLEROSIS, STAGE 1 THROUGH STAGE 4 OR UNSPECIFIED CHRONIC KIDNEY DISEASE: Primary | ICD-10-CM

## 2025-08-11 DIAGNOSIS — I10 ESSENTIAL HYPERTENSION: ICD-10-CM

## 2025-08-11 PROCEDURE — 99214 OFFICE O/P EST MOD 30 MIN: CPT | Performed by: INTERNAL MEDICINE

## 2025-08-11 PROCEDURE — 3080F DIAST BP >= 90 MM HG: CPT | Performed by: INTERNAL MEDICINE

## 2025-08-11 PROCEDURE — 3077F SYST BP >= 140 MM HG: CPT | Performed by: INTERNAL MEDICINE

## 2025-08-11 RX ORDER — ALLOPURINOL 300 MG/1
300 TABLET ORAL DAILY
Qty: 30 TABLET | Refills: 11 | Status: SHIPPED | OUTPATIENT
Start: 2025-08-11

## 2025-08-11 RX ORDER — TELMISARTAN 40 MG/1
TABLET ORAL
Qty: 60 TABLET | Refills: 11 | Status: SHIPPED | OUTPATIENT
Start: 2025-08-11

## 2025-08-11 RX ORDER — AMLODIPINE BESYLATE 10 MG/1
10 TABLET ORAL DAILY
Qty: 30 TABLET | Refills: 11 | Status: CANCELLED | OUTPATIENT
Start: 2025-08-11

## 2025-08-11 RX ORDER — NIFEDIPINE 60 MG/1
60 TABLET, EXTENDED RELEASE ORAL 2 TIMES DAILY
Qty: 60 TABLET | Refills: 11 | Status: SHIPPED | OUTPATIENT
Start: 2025-08-11

## 2025-08-11 RX ORDER — ROSUVASTATIN CALCIUM 20 MG/1
20 TABLET, COATED ORAL NIGHTLY
Qty: 30 TABLET | Refills: 11 | Status: SHIPPED | OUTPATIENT
Start: 2025-08-11

## 2025-08-11 SDOH — ECONOMIC STABILITY: FOOD INSECURITY: WITHIN THE PAST 12 MONTHS, THE FOOD YOU BOUGHT JUST DIDN'T LAST AND YOU DIDN'T HAVE MONEY TO GET MORE.: NEVER TRUE

## 2025-08-11 ASSESSMENT — PATIENT HEALTH QUESTIONNAIRE - PHQ9
2. FEELING DOWN, DEPRESSED OR HOPELESS: NOT AT ALL
SUM OF ALL RESPONSES TO PHQ QUESTIONS 1-9: 0
1. LITTLE INTEREST OR PLEASURE IN DOING THINGS: NOT AT ALL
SUM OF ALL RESPONSES TO PHQ QUESTIONS 1-9: 0

## 2025-08-12 LAB
ANION GAP SERPL CALC-SCNC: 13 MMOL/L (ref 2–14)
BUN SERPL-MCNC: 18 MG/DL (ref 6–20)
BUN/CREAT SERPL: 13 (ref 12–20)
CALCIUM SERPL-MCNC: 9.4 MG/DL (ref 8.6–10)
CHLORIDE SERPL-SCNC: 103 MMOL/L (ref 98–107)
CO2 SERPL-SCNC: 29 MMOL/L (ref 20–29)
CREAT SERPL-MCNC: 1.38 MG/DL (ref 0.7–1.2)
GLUCOSE SERPL-MCNC: 81 MG/DL (ref 65–100)
POTASSIUM SERPL-SCNC: 3.3 MMOL/L (ref 3.5–5.1)
SODIUM SERPL-SCNC: 145 MMOL/L (ref 136–145)

## 2025-08-13 RX ORDER — PREDNISONE 10 MG/1
10 TABLET ORAL DAILY
Qty: 30 TABLET | Refills: 0 | Status: SHIPPED | OUTPATIENT
Start: 2025-08-13